# Patient Record
Sex: MALE | Race: WHITE | NOT HISPANIC OR LATINO | Employment: OTHER | URBAN - METROPOLITAN AREA
[De-identification: names, ages, dates, MRNs, and addresses within clinical notes are randomized per-mention and may not be internally consistent; named-entity substitution may affect disease eponyms.]

---

## 2017-04-09 ENCOUNTER — COMMUNICATION - HEALTHEAST (OUTPATIENT)
Dept: INTERNAL MEDICINE | Facility: CLINIC | Age: 57
End: 2017-04-09

## 2017-04-09 DIAGNOSIS — I10 UNSPECIFIED ESSENTIAL HYPERTENSION: ICD-10-CM

## 2017-07-10 ENCOUNTER — COMMUNICATION - HEALTHEAST (OUTPATIENT)
Dept: INTERNAL MEDICINE | Facility: CLINIC | Age: 57
End: 2017-07-10

## 2018-01-09 ENCOUNTER — AMBULATORY - HEALTHEAST (OUTPATIENT)
Dept: INTERNAL MEDICINE | Facility: CLINIC | Age: 58
End: 2018-01-09

## 2018-01-09 DIAGNOSIS — Z12.11 SCREENING FOR COLON CANCER: ICD-10-CM

## 2018-02-09 ENCOUNTER — RECORDS - HEALTHEAST (OUTPATIENT)
Dept: ADMINISTRATIVE | Facility: OTHER | Age: 58
End: 2018-02-09

## 2018-03-01 ENCOUNTER — RECORDS - HEALTHEAST (OUTPATIENT)
Dept: ADMINISTRATIVE | Facility: OTHER | Age: 58
End: 2018-03-01

## 2018-03-02 ENCOUNTER — RECORDS - HEALTHEAST (OUTPATIENT)
Dept: ADMINISTRATIVE | Facility: OTHER | Age: 58
End: 2018-03-02

## 2018-03-16 ENCOUNTER — OFFICE VISIT - HEALTHEAST (OUTPATIENT)
Dept: INTERNAL MEDICINE | Facility: CLINIC | Age: 58
End: 2018-03-16

## 2018-03-16 DIAGNOSIS — Z12.5 SPECIAL SCREENING FOR MALIGNANT NEOPLASM OF PROSTATE: ICD-10-CM

## 2018-03-16 DIAGNOSIS — I48.92 ATRIAL FLUTTER (H): ICD-10-CM

## 2018-03-16 DIAGNOSIS — E78.00 HYPERCHOLESTEREMIA: ICD-10-CM

## 2018-03-16 DIAGNOSIS — D12.6 ADENOMATOUS POLYP OF COLON: ICD-10-CM

## 2018-03-16 DIAGNOSIS — Z00.00 ROUTINE GENERAL MEDICAL EXAMINATION AT A HEALTH CARE FACILITY: ICD-10-CM

## 2018-03-16 DIAGNOSIS — I10 ESSENTIAL HYPERTENSION: ICD-10-CM

## 2018-03-16 LAB
ALBUMIN SERPL-MCNC: 4.3 G/DL (ref 3.5–5)
ALBUMIN UR-MCNC: NEGATIVE MG/DL
ALP SERPL-CCNC: 67 U/L (ref 45–120)
ALT SERPL W P-5'-P-CCNC: 46 U/L (ref 0–45)
ANION GAP SERPL CALCULATED.3IONS-SCNC: 10 MMOL/L (ref 5–18)
APPEARANCE UR: CLEAR
AST SERPL W P-5'-P-CCNC: 41 U/L (ref 0–40)
BILIRUB SERPL-MCNC: 1.2 MG/DL (ref 0–1)
BILIRUB UR QL STRIP: NEGATIVE
BUN SERPL-MCNC: 15 MG/DL (ref 8–22)
CALCIUM SERPL-MCNC: 9.9 MG/DL (ref 8.5–10.5)
CHLORIDE BLD-SCNC: 95 MMOL/L (ref 98–107)
CO2 SERPL-SCNC: 31 MMOL/L (ref 22–31)
COLOR UR AUTO: YELLOW
CREAT SERPL-MCNC: 0.81 MG/DL (ref 0.7–1.3)
ERYTHROCYTE [DISTWIDTH] IN BLOOD BY AUTOMATED COUNT: 10.8 % (ref 11–14.5)
GFR SERPL CREATININE-BSD FRML MDRD: >60 ML/MIN/1.73M2
GLUCOSE BLD-MCNC: 98 MG/DL (ref 70–125)
GLUCOSE UR STRIP-MCNC: NEGATIVE MG/DL
HCT VFR BLD AUTO: 46.4 % (ref 40–54)
HGB BLD-MCNC: 15.8 G/DL (ref 14–18)
HGB UR QL STRIP: NEGATIVE
KETONES UR STRIP-MCNC: NEGATIVE MG/DL
LEUKOCYTE ESTERASE UR QL STRIP: NEGATIVE
MCH RBC QN AUTO: 31.5 PG (ref 27–34)
MCHC RBC AUTO-ENTMCNC: 34 G/DL (ref 32–36)
MCV RBC AUTO: 93 FL (ref 80–100)
NITRATE UR QL: NEGATIVE
PH UR STRIP: 7.5 [PH] (ref 5–8)
PLATELET # BLD AUTO: 197 THOU/UL (ref 140–440)
PMV BLD AUTO: 7.9 FL (ref 7–10)
POTASSIUM BLD-SCNC: 3.9 MMOL/L (ref 3.5–5)
PROT SERPL-MCNC: 7.8 G/DL (ref 6–8)
PSA SERPL-MCNC: 1.3 NG/ML (ref 0–3.5)
RBC # BLD AUTO: 5.01 MILL/UL (ref 4.4–6.2)
SODIUM SERPL-SCNC: 136 MMOL/L (ref 136–145)
SP GR UR STRIP: 1.01 (ref 1–1.03)
UROBILINOGEN UR STRIP-ACNC: NORMAL
WBC: 5.9 THOU/UL (ref 4–11)

## 2018-03-16 ASSESSMENT — MIFFLIN-ST. JEOR: SCORE: 1882.18

## 2018-04-04 ENCOUNTER — COMMUNICATION - HEALTHEAST (OUTPATIENT)
Dept: INTERNAL MEDICINE | Facility: CLINIC | Age: 58
End: 2018-04-04

## 2018-04-04 DIAGNOSIS — I10 ESSENTIAL HYPERTENSION: ICD-10-CM

## 2018-04-11 ENCOUNTER — RECORDS - HEALTHEAST (OUTPATIENT)
Dept: ADMINISTRATIVE | Facility: OTHER | Age: 58
End: 2018-04-11

## 2018-04-16 ENCOUNTER — RECORDS - HEALTHEAST (OUTPATIENT)
Dept: ADMINISTRATIVE | Facility: OTHER | Age: 58
End: 2018-04-16

## 2018-07-20 ENCOUNTER — OFFICE VISIT - HEALTHEAST (OUTPATIENT)
Dept: INTERNAL MEDICINE | Facility: CLINIC | Age: 58
End: 2018-07-20

## 2018-07-20 DIAGNOSIS — M54.16 LEFT LUMBAR RADICULITIS: ICD-10-CM

## 2018-07-26 ENCOUNTER — COMMUNICATION - HEALTHEAST (OUTPATIENT)
Dept: INTERNAL MEDICINE | Facility: CLINIC | Age: 58
End: 2018-07-26

## 2018-07-26 DIAGNOSIS — M54.16 LUMBAR RADICULOPATHY: ICD-10-CM

## 2018-07-30 ENCOUNTER — HOSPITAL ENCOUNTER (OUTPATIENT)
Dept: MRI IMAGING | Facility: CLINIC | Age: 58
Discharge: HOME | End: 2018-07-30
Attending: INTERNAL MEDICINE

## 2018-07-30 DIAGNOSIS — M54.16 LUMBAR RADICULOPATHY: ICD-10-CM

## 2018-08-03 ENCOUNTER — COMMUNICATION - HEALTHEAST (OUTPATIENT)
Dept: INTERNAL MEDICINE | Facility: CLINIC | Age: 58
End: 2018-08-03

## 2018-08-06 ENCOUNTER — AMBULATORY - HEALTHEAST (OUTPATIENT)
Dept: INTERNAL MEDICINE | Facility: CLINIC | Age: 58
End: 2018-08-06

## 2018-08-06 DIAGNOSIS — M54.16 LEFT LUMBAR RADICULOPATHY: ICD-10-CM

## 2018-08-09 ENCOUNTER — HOSPITAL ENCOUNTER (OUTPATIENT)
Dept: PHYSICAL MEDICINE AND REHAB | Facility: CLINIC | Age: 58
Discharge: HOME | End: 2018-08-09
Attending: INTERNAL MEDICINE

## 2018-08-09 ENCOUNTER — HOSPITAL ENCOUNTER (OUTPATIENT)
Dept: RADIOLOGY | Facility: CLINIC | Age: 58
Discharge: HOME | End: 2018-08-09

## 2018-08-09 DIAGNOSIS — M54.16 LUMBAR RADICULOPATHY: ICD-10-CM

## 2018-08-09 DIAGNOSIS — M47.816 LUMBAR SPONDYLOSIS: ICD-10-CM

## 2018-08-09 RX ORDER — IBUPROFEN 200 MG
800 TABLET ORAL PRN
Status: SHIPPED | COMMUNITY
Start: 2018-08-09 | End: 2023-01-30

## 2018-08-09 ASSESSMENT — MIFFLIN-ST. JEOR: SCORE: 1831.04

## 2018-08-10 ENCOUNTER — COMMUNICATION - HEALTHEAST (OUTPATIENT)
Dept: PHYSICAL MEDICINE AND REHAB | Facility: CLINIC | Age: 58
End: 2018-08-10

## 2018-08-16 ENCOUNTER — RECORDS - HEALTHEAST (OUTPATIENT)
Dept: ADMINISTRATIVE | Facility: OTHER | Age: 58
End: 2018-08-16

## 2018-08-20 ENCOUNTER — HOSPITAL ENCOUNTER (OUTPATIENT)
Dept: PHYSICAL MEDICINE AND REHAB | Facility: CLINIC | Age: 58
Discharge: HOME | End: 2018-08-20
Attending: PHYSICAL MEDICINE & REHABILITATION

## 2018-08-20 DIAGNOSIS — M54.16 LUMBAR RADICULOPATHY: ICD-10-CM

## 2018-08-28 ENCOUNTER — COMMUNICATION - HEALTHEAST (OUTPATIENT)
Dept: PHYSICAL MEDICINE AND REHAB | Facility: CLINIC | Age: 58
End: 2018-08-28

## 2018-09-06 ENCOUNTER — HOSPITAL ENCOUNTER (OUTPATIENT)
Dept: PHYSICAL MEDICINE AND REHAB | Facility: CLINIC | Age: 58
Discharge: HOME | End: 2018-09-06
Attending: PAIN MEDICINE

## 2018-09-06 DIAGNOSIS — G57.30 PERONEAL NEUROPATHY: ICD-10-CM

## 2018-09-06 DIAGNOSIS — M47.816 LUMBAR SPONDYLOSIS: ICD-10-CM

## 2018-10-05 ENCOUNTER — RECORDS - HEALTHEAST (OUTPATIENT)
Dept: ADMINISTRATIVE | Facility: OTHER | Age: 58
End: 2018-10-05

## 2018-10-30 ENCOUNTER — RECORDS - HEALTHEAST (OUTPATIENT)
Dept: ADMINISTRATIVE | Facility: OTHER | Age: 58
End: 2018-10-30

## 2018-11-28 ENCOUNTER — RECORDS - HEALTHEAST (OUTPATIENT)
Dept: ADMINISTRATIVE | Facility: OTHER | Age: 58
End: 2018-11-28

## 2018-12-02 ENCOUNTER — COMMUNICATION - HEALTHEAST (OUTPATIENT)
Dept: INTERNAL MEDICINE | Facility: CLINIC | Age: 58
End: 2018-12-02

## 2018-12-02 DIAGNOSIS — I10 ESSENTIAL HYPERTENSION: ICD-10-CM

## 2019-01-10 ENCOUNTER — COMMUNICATION - HEALTHEAST (OUTPATIENT)
Dept: INTERNAL MEDICINE | Facility: CLINIC | Age: 59
End: 2019-01-10

## 2019-01-10 DIAGNOSIS — I10 ESSENTIAL HYPERTENSION: ICD-10-CM

## 2019-01-11 ENCOUNTER — COMMUNICATION - HEALTHEAST (OUTPATIENT)
Dept: INTERNAL MEDICINE | Facility: CLINIC | Age: 59
End: 2019-01-11

## 2019-01-11 DIAGNOSIS — I10 ESSENTIAL HYPERTENSION: ICD-10-CM

## 2019-01-18 ENCOUNTER — COMMUNICATION - HEALTHEAST (OUTPATIENT)
Dept: INTERNAL MEDICINE | Facility: CLINIC | Age: 59
End: 2019-01-18

## 2019-01-24 ENCOUNTER — COMMUNICATION - HEALTHEAST (OUTPATIENT)
Dept: INTERNAL MEDICINE | Facility: CLINIC | Age: 59
End: 2019-01-24

## 2019-02-07 ENCOUNTER — COMMUNICATION - HEALTHEAST (OUTPATIENT)
Dept: INTERNAL MEDICINE | Facility: CLINIC | Age: 59
End: 2019-02-07

## 2019-02-07 ENCOUNTER — OFFICE VISIT - HEALTHEAST (OUTPATIENT)
Dept: INTERNAL MEDICINE | Facility: CLINIC | Age: 59
End: 2019-02-07

## 2019-02-07 DIAGNOSIS — I10 ESSENTIAL HYPERTENSION: ICD-10-CM

## 2019-02-07 LAB
ALBUMIN SERPL-MCNC: 4 G/DL (ref 3.5–5)
ALP SERPL-CCNC: 64 U/L (ref 45–120)
ALT SERPL W P-5'-P-CCNC: 39 U/L (ref 0–45)
ANION GAP SERPL CALCULATED.3IONS-SCNC: 8 MMOL/L (ref 5–18)
AST SERPL W P-5'-P-CCNC: 35 U/L (ref 0–40)
BILIRUB SERPL-MCNC: 0.6 MG/DL (ref 0–1)
BUN SERPL-MCNC: 13 MG/DL (ref 8–22)
CALCIUM SERPL-MCNC: 9.6 MG/DL (ref 8.5–10.5)
CHLORIDE BLD-SCNC: 97 MMOL/L (ref 98–107)
CO2 SERPL-SCNC: 31 MMOL/L (ref 22–31)
CREAT SERPL-MCNC: 0.73 MG/DL (ref 0.7–1.3)
GFR SERPL CREATININE-BSD FRML MDRD: >60 ML/MIN/1.73M2
GLUCOSE BLD-MCNC: 82 MG/DL (ref 70–125)
POTASSIUM BLD-SCNC: 4 MMOL/L (ref 3.5–5)
PROT SERPL-MCNC: 7.5 G/DL (ref 6–8)
SODIUM SERPL-SCNC: 136 MMOL/L (ref 136–145)

## 2019-02-13 ENCOUNTER — COMMUNICATION - HEALTHEAST (OUTPATIENT)
Dept: INTERNAL MEDICINE | Facility: CLINIC | Age: 59
End: 2019-02-13

## 2019-02-13 DIAGNOSIS — I10 ESSENTIAL HYPERTENSION: ICD-10-CM

## 2019-02-18 ENCOUNTER — COMMUNICATION - HEALTHEAST (OUTPATIENT)
Dept: INTERNAL MEDICINE | Facility: CLINIC | Age: 59
End: 2019-02-18

## 2019-08-08 ENCOUNTER — OFFICE VISIT - HEALTHEAST (OUTPATIENT)
Dept: INTERNAL MEDICINE | Facility: CLINIC | Age: 59
End: 2019-08-08

## 2019-08-08 DIAGNOSIS — I10 ESSENTIAL HYPERTENSION: ICD-10-CM

## 2019-08-08 DIAGNOSIS — Z00.00 ROUTINE GENERAL MEDICAL EXAMINATION AT A HEALTH CARE FACILITY: ICD-10-CM

## 2019-08-08 DIAGNOSIS — Z86.79 HISTORY OF ATRIAL FLUTTER: ICD-10-CM

## 2019-08-08 LAB
ALBUMIN SERPL-MCNC: 4.3 G/DL (ref 3.5–5)
ALP SERPL-CCNC: 68 U/L (ref 45–120)
ALT SERPL W P-5'-P-CCNC: 29 U/L (ref 0–45)
ANION GAP SERPL CALCULATED.3IONS-SCNC: 12 MMOL/L (ref 5–18)
AST SERPL W P-5'-P-CCNC: 32 U/L (ref 0–40)
BILIRUB SERPL-MCNC: 0.9 MG/DL (ref 0–1)
BUN SERPL-MCNC: 9 MG/DL (ref 8–22)
CALCIUM SERPL-MCNC: 9.9 MG/DL (ref 8.5–10.5)
CHLORIDE BLD-SCNC: 96 MMOL/L (ref 98–107)
CHOLEST SERPL-MCNC: 237 MG/DL
CO2 SERPL-SCNC: 29 MMOL/L (ref 22–31)
CREAT SERPL-MCNC: 0.69 MG/DL (ref 0.7–1.3)
FASTING STATUS PATIENT QL REPORTED: YES
GFR SERPL CREATININE-BSD FRML MDRD: >60 ML/MIN/1.73M2
GLUCOSE BLD-MCNC: 89 MG/DL (ref 70–125)
HDLC SERPL-MCNC: 75 MG/DL
LDLC SERPL CALC-MCNC: 148 MG/DL
POTASSIUM BLD-SCNC: 3.8 MMOL/L (ref 3.5–5)
PROT SERPL-MCNC: 7.1 G/DL (ref 6–8)
PSA SERPL-MCNC: 1 NG/ML (ref 0–4.5)
SODIUM SERPL-SCNC: 137 MMOL/L (ref 136–145)
TRIGL SERPL-MCNC: 69 MG/DL

## 2019-08-08 ASSESSMENT — MIFFLIN-ST. JEOR: SCORE: 1826.84

## 2019-08-09 LAB — HCV AB SERPL QL IA: NEGATIVE

## 2019-08-29 ENCOUNTER — RECORDS - HEALTHEAST (OUTPATIENT)
Dept: ADMINISTRATIVE | Facility: OTHER | Age: 59
End: 2019-08-29

## 2019-09-16 ENCOUNTER — COMMUNICATION - HEALTHEAST (OUTPATIENT)
Dept: ADMINISTRATIVE | Facility: CLINIC | Age: 59
End: 2019-09-16

## 2020-06-16 ENCOUNTER — COMMUNICATION - HEALTHEAST (OUTPATIENT)
Dept: SCHEDULING | Facility: CLINIC | Age: 60
End: 2020-06-16

## 2020-07-07 ENCOUNTER — RECORDS - HEALTHEAST (OUTPATIENT)
Dept: ADMINISTRATIVE | Facility: OTHER | Age: 60
End: 2020-07-07

## 2020-07-07 LAB — EJECTION FRACTION: 60 %

## 2020-07-18 ENCOUNTER — OFFICE VISIT - HEALTHEAST (OUTPATIENT)
Dept: FAMILY MEDICINE | Facility: CLINIC | Age: 60
End: 2020-07-18

## 2020-07-18 DIAGNOSIS — Z48.02 ENCOUNTER FOR REMOVAL OF SUTURES: ICD-10-CM

## 2020-08-22 ENCOUNTER — COMMUNICATION - HEALTHEAST (OUTPATIENT)
Dept: INTERNAL MEDICINE | Facility: CLINIC | Age: 60
End: 2020-08-22

## 2020-08-22 DIAGNOSIS — I10 ESSENTIAL HYPERTENSION: ICD-10-CM

## 2020-08-27 ENCOUNTER — OFFICE VISIT - HEALTHEAST (OUTPATIENT)
Dept: INTERNAL MEDICINE | Facility: CLINIC | Age: 60
End: 2020-08-27

## 2020-08-27 DIAGNOSIS — I48.0 PAROXYSMAL ATRIAL FIBRILLATION (H): ICD-10-CM

## 2020-08-27 DIAGNOSIS — E78.00 HYPERCHOLESTEREMIA: ICD-10-CM

## 2020-08-27 DIAGNOSIS — I10 ESSENTIAL HYPERTENSION: ICD-10-CM

## 2020-08-27 DIAGNOSIS — Z00.00 ROUTINE GENERAL MEDICAL EXAMINATION AT A HEALTH CARE FACILITY: ICD-10-CM

## 2020-08-27 LAB
ALBUMIN SERPL-MCNC: 4.3 G/DL (ref 3.5–5)
ALP SERPL-CCNC: 66 U/L (ref 45–120)
ALT SERPL W P-5'-P-CCNC: 36 U/L (ref 0–45)
ANION GAP SERPL CALCULATED.3IONS-SCNC: 9 MMOL/L (ref 5–18)
AST SERPL W P-5'-P-CCNC: 37 U/L (ref 0–40)
BILIRUB SERPL-MCNC: 0.5 MG/DL (ref 0–1)
BUN SERPL-MCNC: 12 MG/DL (ref 8–22)
CALCIUM SERPL-MCNC: 9.6 MG/DL (ref 8.5–10.5)
CHLORIDE BLD-SCNC: 100 MMOL/L (ref 98–107)
CHOLEST SERPL-MCNC: 214 MG/DL
CO2 SERPL-SCNC: 30 MMOL/L (ref 22–31)
CREAT SERPL-MCNC: 0.75 MG/DL (ref 0.7–1.3)
FASTING STATUS PATIENT QL REPORTED: YES
GFR SERPL CREATININE-BSD FRML MDRD: >60 ML/MIN/1.73M2
GLUCOSE BLD-MCNC: 94 MG/DL (ref 70–125)
HDLC SERPL-MCNC: 63 MG/DL
LDLC SERPL CALC-MCNC: 117 MG/DL
POTASSIUM BLD-SCNC: 4.3 MMOL/L (ref 3.5–5)
PROT SERPL-MCNC: 7.4 G/DL (ref 6–8)
PSA SERPL-MCNC: 0.9 NG/ML (ref 0–4.5)
SODIUM SERPL-SCNC: 139 MMOL/L (ref 136–145)
TRIGL SERPL-MCNC: 170 MG/DL

## 2020-08-27 RX ORDER — SIMVASTATIN 40 MG
40 TABLET ORAL DAILY
Status: SHIPPED | COMMUNITY
Start: 2020-06-18 | End: 2024-04-12

## 2020-08-27 RX ORDER — METOPROLOL SUCCINATE 25 MG/1
25 TABLET, EXTENDED RELEASE ORAL DAILY
Status: SHIPPED | COMMUNITY
Start: 2020-06-18 | End: 2024-04-12

## 2020-08-27 ASSESSMENT — MIFFLIN-ST. JEOR: SCORE: 1862

## 2020-09-15 ENCOUNTER — COMMUNICATION - HEALTHEAST (OUTPATIENT)
Dept: INTERNAL MEDICINE | Facility: CLINIC | Age: 60
End: 2020-09-15

## 2020-09-15 DIAGNOSIS — I10 ESSENTIAL HYPERTENSION: ICD-10-CM

## 2020-09-15 RX ORDER — LOSARTAN POTASSIUM 50 MG/1
50 TABLET ORAL DAILY
Qty: 90 TABLET | Refills: 3 | Status: SHIPPED | OUTPATIENT
Start: 2020-09-15 | End: 2021-12-01

## 2020-09-15 RX ORDER — CHLORTHALIDONE 25 MG/1
25 TABLET ORAL DAILY
Qty: 90 TABLET | Refills: 3 | Status: SHIPPED | OUTPATIENT
Start: 2020-09-15 | End: 2021-12-24

## 2020-10-06 ENCOUNTER — RECORDS - HEALTHEAST (OUTPATIENT)
Dept: ADMINISTRATIVE | Facility: OTHER | Age: 60
End: 2020-10-06

## 2021-05-31 NOTE — PROGRESS NOTES
Assessment:      Healthy male exam.      Hypertension, controlled.    History of atrial flutter, status post cardioversion in 2005.     Plan:       Check a CMP, PSA, lipid profile, and hep C antibody.  Vaccinations are up-to-date.  Colon cancer screening is up-to-date.  Follow-up 1 year, earlier if needed.     Subjective:      Alan Frausto is a 58 y.o. male who presents for an annual exam.  He is feeling well today and has no acute complaints.  He wants to get back into routine cardiovascular fitness and I told him I saw no medical contraindications to this.        Immunization History   Administered Date(s) Administered     DT (pediatric) 10/14/2002, 02/09/2006     Influenza, Seasonal, Inj PF IIV3 02/16/2011, 12/03/2013     Td,adult,historic,unspecified 02/09/2006     Tdap 07/28/2015     Immunization status: up to date and documented.    No exam data present    Current Outpatient Medications   Medication Sig Dispense Refill     chlorthalidone (HYGROTEN) 25 MG tablet Take 1 tablet (25 mg total) by mouth daily. 90 tablet 3     cholecalciferol, vitamin D3, (VITAMIN D3) 2,000 unit capsule Take 1,000 Units by mouth daily.       ibuprofen (ADVIL,MOTRIN) 200 MG tablet Take 800 mg by mouth as needed.              losartan (COZAAR) 50 MG tablet Take 1 tablet (50 mg total) by mouth daily. 90 tablet 3     multivitamin therapeutic tablet Take 1 tablet by mouth daily.       No current facility-administered medications for this visit.      History reviewed. No pertinent past medical history.  Past Surgical History:   Procedure Laterality Date     CATARACT EXTRACTION Right 1998     INGUINAL HERNIA REPAIR Left 2007     Patient has no known allergies.  Family History   Problem Relation Age of Onset     Macular degeneration Father      Cancer Mother         bladder - smoker     Social History     Socioeconomic History     Marital status:      Spouse name: Not on file     Number of children: Not on file     Years of  "education: Not on file     Highest education level: Not on file   Occupational History     Not on file   Social Needs     Financial resource strain: Not on file     Food insecurity:     Worry: Not on file     Inability: Not on file     Transportation needs:     Medical: Not on file     Non-medical: Not on file   Tobacco Use     Smoking status: Never Smoker     Smokeless tobacco: Never Used   Substance and Sexual Activity     Alcohol use: Yes     Alcohol/week: 3.6 - 4.8 oz     Types: 6 - 8 Glasses of wine per week     Drug use: No     Sexual activity: Yes     Partners: Female   Lifestyle     Physical activity:     Days per week: Not on file     Minutes per session: Not on file     Stress: Not on file   Relationships     Social connections:     Talks on phone: Not on file     Gets together: Not on file     Attends Yazidism service: Not on file     Active member of club or organization: Not on file     Attends meetings of clubs or organizations: Not on file     Relationship status: Not on file     Intimate partner violence:     Fear of current or ex partner: Not on file     Emotionally abused: Not on file     Physically abused: Not on file     Forced sexual activity: Not on file   Other Topics Concern     Not on file   Social History Narrative     Not on file       Review of Systems  General:  Denies problem  Eyes: Denies problem  Ears/Nose/Throat: Denies problem  Cardiovascular: Denies problem  Respiratory:  Denies problem  Gastrointestinal:  Denies problem  Genitourinary: Denies problem  Musculoskeletal:  Denies problem  Skin: Denies problem  Neurologic: Denies problem  Psychiatric: Denies problem  Endocrine: Denies problem  Heme/Lymphatic: Denies problem   Allergic/Immunologic: Denies problem        Objective:     Vitals:    08/08/19 0808 08/08/19 0837   BP: (!) 152/96 139/86   Pulse: 68    Weight: 211 lb (95.7 kg)    Height: 6' 2\" (1.88 m)      Body mass index is 27.09 kg/m .    Physical  General Appearance: " Alert, cooperative, no distress, appears stated age  Head: Normocephalic, without obvious abnormality, atraumatic  Eyes: PERRL, conjunctiva/corneas clear, EOM's intact  Ears: Normal TM's and external ear canals, both ears  Nose: Nares normal, septum midline,mucosa normal, no drainage  Throat: Lips, mucosa, and tongue normal; teeth and gums normal  Neck: Supple, symmetrical, trachea midline, no adenopathy;  thyroid: not enlarged, symmetric, no tenderness/mass/nodules; no carotid bruit or JVD  Back: Symmetric, no curvature, ROM normal, no CVA tenderness  Lungs: Clear to auscultation bilaterally, respirations unlabored  Heart: Regular rate and rhythm, S1 and S2 normal, no murmur, rub, or gallop,  Abdomen: Soft, non-tender, bowel sounds active all four quadrants,  no masses, no organomegaly  Musculoskeletal: Normal range of motion. No joint swelling or deformity.   Extremities: Extremities normal, atraumatic, no cyanosis or edema  Skin: Skin color, texture, turgor normal, no rashes or lesions  Lymph nodes: Cervical, supraclavicular, and axillary nodes normal  Neurologic: He is alert. He has normal reflexes.   Psychiatric: He has a normal mood and affect.

## 2021-06-01 VITALS — WEIGHT: 214.4 LBS | BODY MASS INDEX: 26.98 KG/M2

## 2021-06-01 VITALS — HEIGHT: 75 IN | WEIGHT: 219.7 LBS | BODY MASS INDEX: 27.32 KG/M2

## 2021-06-01 VITALS — BODY MASS INDEX: 26.02 KG/M2 | HEIGHT: 75 IN | WEIGHT: 209.3 LBS

## 2021-06-01 NOTE — TELEPHONE ENCOUNTER
Form completed and signed by Dr. Ellison. Copy placed to scans. Original mailed back to patient.  Kat Ledezma CMA ............... 4:37 PM, 09/19/19

## 2021-06-02 VITALS — WEIGHT: 223 LBS | BODY MASS INDEX: 28.06 KG/M2

## 2021-06-03 VITALS — WEIGHT: 211 LBS | BODY MASS INDEX: 27.08 KG/M2 | HEIGHT: 74 IN

## 2021-06-04 VITALS
OXYGEN SATURATION: 96 % | WEIGHT: 217 LBS | DIASTOLIC BLOOD PRESSURE: 77 MMHG | RESPIRATION RATE: 16 BRPM | BODY MASS INDEX: 27.86 KG/M2 | HEART RATE: 69 BPM | SYSTOLIC BLOOD PRESSURE: 138 MMHG

## 2021-06-04 VITALS
HEIGHT: 75 IN | BODY MASS INDEX: 26.98 KG/M2 | WEIGHT: 217 LBS | SYSTOLIC BLOOD PRESSURE: 132 MMHG | DIASTOLIC BLOOD PRESSURE: 89 MMHG | HEART RATE: 72 BPM

## 2021-06-08 NOTE — TELEPHONE ENCOUNTER
RN triage  Alan is calling today stating that in 2005 he had an episode of afib, was medicated and the rhythm resolved on it's own. He states he has not had any issues really with it since except when he becomes dehydrated. He says that on Sunday his blood pressure cuff is reporting that he has been in an irregular rhythm. Heart rate ranges from 60-90, occasionally reading at 140. Does not report that his heart is beating very fast currently. Bps range 100//80. Sunday he felt dizzy when he got up from sitting. Tried to exercise today and felt tired. Last had symptoms about 1 year ago, typically will rest and drink fluids and rhythm will resolve. No chest pain, shortness of breath only with exertion. Not currently dizzy.     I advised Alan he should be seen in ER/UC today to evaluate heart rhythm and symptoms, Alan agrees to this plan and understands to call back with worsening.    Thao Landin RN  [facility] Nurse Advisor      Additional Information    Negative: Passed out (i.e., fainted, collapsed and was not responding)    Negative: Shock suspected (e.g., cold/pale/clammy skin, too weak to stand, low BP, rapid pulse)    Negative: Difficult to awaken or acting confused (e.g., disoriented, slurred speech)    Negative: Visible sweat on face or sweat dripping down face    Negative: Unable to walk, or can only walk with assistance (e.g., requires support)    Negative: Received SHOCK from implantable cardiac defibrillator and has persisting symptoms (i.e., palpitations, lightheadedness)    Negative: Sounds like a life-threatening emergency to the triager    New or worsened shortness of breath with activity (dyspnea on exertion)    Negative: Difficulty breathing    Negative: Dizziness, lightheadedness, or weakness    Negative: Heart beating very rapidly (e.g., > 140 / minute) and present now (EXCEPTION: during exercise)    Negative: Heart beating very slowly (e.g., < 50 / minute) (EXCEPTION:  athlete)    Protocols used: HEART RATE AND HEARTBEAT [id number]    COVID 19 Nurse Triage Plan/Patient Instructions    Please be aware that novel coronavirus (COVID-19) may be circulating in the community. If you develop symptoms such as fever, cough, or SOB or if you have concerns about the presence of another infection including coronavirus (COVID-19), please contact your health care provider or visit [email or web address].     Disposition/Instructions    Patient to schedule an In Person Visit with provider. Reference Visit Selection Guide.    Thank you for taking steps to prevent the spread of this virus.  o Limit your contact with others.  o Wear a simple mask to cover your cough.  o Wash your hands well and often.    Resources    [facility]: About COVID-19: [email or web address]    CDC: What to Do If You're Sick: www.cdc.gov/coronavirus/2019-ncov/about/steps-when-sick.html    CDC: Ending Home Isolation: www.cdc.gov/coronavirus/2019-ncov/hcp/disposition-in-home-patients.html     CDC: Caring for Someone: www.cdc.gov/coronavirus/2019-ncov/if-you-are-sick/care-for-someone.html     [facility]: Interim Guidance for Hospital Discharge to Home: [email or web address]/[email or web address]    [facility] clinical trials (COVID-19 research studies): clinicalaffairs.[facility]-clinical-trials     Below are the COVID-19 hotlines at the [facility] ([facility]). Interpreters are available.   o For health questions: Call 512-885-0096 or 1-896.249.4338 (7 a.m. to 7 p.m.)  o For questions about schools and childcare: Call 453-863-4444 or 1-585.527.8417 (7 a.m. to 7 p.m.)

## 2021-06-09 NOTE — PROGRESS NOTES
"Patient presents for suture removal. Patient sustained a laceration with a chain saw on 7/4 to his left knee, was seen at [facility]. Tetanus updated and recommended removal of sutures in 10-14 days.  The wound is well healed without signs of infection. No fevers, chills or surrounding erythema. The sutures removed. Wound care and activity instructions given. Return prn. See procedure note as below       PROCEDURE: SUTURE REMOVAL   Performed by: Denise Aranda DO  Consent: Verbal consent obtained.  Risks and benefits: risks, benefits and alternatives were discussed  Consent given by: patient  Patient understanding: patient states understanding of the procedure being performed  Patient consent: the patient's understanding of the procedure matches consent given  Patient identity confirmed: verbally with patient   Time out: Immediately prior to procedure a \"time out\" was called to verify the correct patient, procedure, equipment, support staff and site/side marked as required.  Body area: Left Knee   Wound Appearance: clean  Post-removal: Wound kept open and dry. No bandage needed   Patient tolerance: Patient tolerated the procedure well with no immediate complications.      Denise Aranda DO     "

## 2021-06-10 NOTE — TELEPHONE ENCOUNTER
RN cannot approve Refill Request    RN can NOT refill this medication PCP messaged that patient is overdue for Labs. Last office visit: 2/7/2019 Maurizio Ellison MD Last Physical: 8/8/2019 Last MTM visit: Visit date not found Last visit same specialty: 2/7/2019 Maurizio Ellison MD.  Next visit within 3 mo: Visit date not found  Next physical within 3 mo: Visit date not found      Donya Garcia, [facility] Triage/Med Refill 8/23/2020    Requested Prescriptions   Pending Prescriptions Disp Refills     chlorthalidone (HYGROTEN) 25 MG tablet [Pharmacy Med Name: CHLORTHALIDONE 25 MG TABLET] 90 tablet 3     Sig: TAKE 1 TABLET BY MOUTH EVERY DAY       Diuretics/Combination Diuretics Refill Protocol  Failed - 8/22/2020  9:34 AM        Failed - Serum Potassium in past 12 months      No results found for: LN-POTASSIUM          Failed - Serum Sodium in past 12 months      No results found for: LN-SODIUM          Failed - Serum Creatinine in past 12 months      Creatinine   Date Value Ref Range Status   08/08/2019 0.69 (L) 0.70 - 1.30 mg/dL Final             Passed - Visit with PCP or prescribing provider visit in past 12 months     Last office visit with prescriber/PCP: 2/7/2019 Maurizio Ellison MD OR same dept: Visit date not found OR same specialty: 2/7/2019 Maurizio Ellison MD  Last physical: 8/8/2019 Last MTM visit: Visit date not found   Next visit within 3 mo: Visit date not found  Next physical within 3 mo: Visit date not found  Prescriber OR PCP: Maurizio Ellison MD  Last diagnosis associated with med order: 1. Essential hypertension  - chlorthalidone (HYGROTEN) 25 MG tablet [Pharmacy Med Name: CHLORTHALIDONE 25 MG TABLET]; TAKE 1 TABLET BY MOUTH EVERY DAY  Dispense: 90 tablet; Refill: 3  - losartan (COZAAR) 50 MG tablet [Pharmacy Med Name: LOSARTAN POTASSIUM 50 MG TAB]; TAKE 1 TABLET BY MOUTH EVERY DAY  Dispense: 90 tablet; Refill: 3    If protocol passes may refill for 12 months  if within 3 months of last provider visit (or a total of 15 months).             Passed - Blood pressure on file in past 12 months     BP Readings from Last 1 Encounters:   07/18/20 138/77                losartan (COZAAR) 50 MG tablet [Pharmacy Med Name: LOSARTAN POTASSIUM 50 MG TAB] 90 tablet 3     Sig: TAKE 1 TABLET BY MOUTH EVERY DAY       Angiotensin Receptor Blocker Protocol Failed - 8/22/2020  9:34 AM        Failed - Serum potassium within the past 12 months     No results found for: LN-POTASSIUM          Failed - Serum creatinine within the past 12 months     Creatinine   Date Value Ref Range Status   08/08/2019 0.69 (L) 0.70 - 1.30 mg/dL Final             Passed - PCP or prescribing provider visit in past 12 months       Last office visit with prescriber/PCP: 2/7/2019 Maurizio Ellisno MD OR same dept: Visit date not found OR same specialty: 2/7/2019 Mauriizo Ellison MD  Last physical: 8/8/2019 Last MTM visit: Visit date not found   Next visit within 3 mo: Visit date not found  Next physical within 3 mo: Visit date not found  Prescriber OR PCP: Maurizio Ellison MD  Last diagnosis associated with med order: 1. Essential hypertension  - chlorthalidone (HYGROTEN) 25 MG tablet [Pharmacy Med Name: CHLORTHALIDONE 25 MG TABLET]; TAKE 1 TABLET BY MOUTH EVERY DAY  Dispense: 90 tablet; Refill: 3  - losartan (COZAAR) 50 MG tablet [Pharmacy Med Name: LOSARTAN POTASSIUM 50 MG TAB]; TAKE 1 TABLET BY MOUTH EVERY DAY  Dispense: 90 tablet; Refill: 3    If protocol passes may refill for 12 months if within 3 months of last provider visit (or a total of 15 months).             Passed - Blood pressure filed in past 12 months     BP Readings from Last 1 Encounters:   07/18/20 138/77

## 2021-06-10 NOTE — PROGRESS NOTES
Assessment:      Healthy male exam.      Hypertension, well controlled    Hyperlipidemia    Atrial fibrillation, well rate controlled.     Plan:      Check a lipid profile, CMP, and PSA.  Vaccinations are up-to-date.  Colon cancer screening will be due in 3 years.  Follow-up 1 year, earlier if needed.    Subjective:      Alan Frausto is a 59 y.o. male who presents for an annual exam.  He is feeling well today and has no acute complaints.  He works for [employer] in their [employer] department and has been working from home since March.  His past medical problems above and beyond his physical exam were reviewed and are outlined as below:    History of hypertension, on losartan and metoprolol.  Blood pressure is at goal today.  He will be due for labs.    Hyperlipidemia, currently on simvastatin.  Check lipids as above.    History of atrial fibrillation.  He saw his cardiologist approximately 2 months ago who recommended Xarelto.  After a discussion of the risks and benefits with him Alan has elected to stay off of the Xarelto and use a baby aspirin.  His chads 2 score is 1 and thus he is in an intermediate risk profile.    Immunization History   Administered Date(s) Administered     DT (pediatric) 10/14/2002, 02/09/2006     Influenza, Seasonal, Inj PF IIV3 02/16/2011, 12/03/2013     Td,adult,historic,unspecified 02/09/2006     Tdap 07/28/2015, 07/04/2020     ZOSTER, RECOMBINANT, IM 07/20/2018     Immunization status: up to date and documented, stated as current,   No exam data present    Current Outpatient Medications   Medication Sig Dispense Refill     aspirin 81 MG EC tablet Take 81 mg by mouth daily.       chlorthalidone (HYGROTEN) 25 MG tablet TAKE 1 TABLET BY MOUTH EVERY DAY 90 tablet 3     cholecalciferol, vitamin D3, (VITAMIN D3) 2,000 unit capsule Take 1,000 Units by mouth daily.       ibuprofen (ADVIL,MOTRIN) 200 MG tablet Take 800 mg by mouth as needed.              losartan (COZAAR) 50 MG tablet  TAKE 1 TABLET BY MOUTH EVERY DAY 90 tablet 3     metoprolol succinate (TOPROL-XL) 25 MG Take 25 mg by mouth daily.       multivitamin therapeutic tablet Take 1 tablet by mouth daily.       simvastatin (ZOCOR) 40 MG tablet Take 40 mg by mouth daily.       No current facility-administered medications for this visit.      No past medical history on file.  Past Surgical History:   Procedure Laterality Date     CATARACT EXTRACTION Right 1998     INGUINAL HERNIA REPAIR Left 2007     Patient has no known allergies.  Family History   Problem Relation Age of Onset     Macular degeneration Father      Cancer Mother         bladder - smoker     Social History     Socioeconomic History     Marital status:      Spouse name: Not on file     Number of children: Not on file     Years of education: Not on file     Highest education level: Not on file   Occupational History     Not on file   Social Needs     Financial resource strain: Not on file     Food insecurity     Worry: Not on file     Inability: Not on file     Transportation needs     Medical: Not on file     Non-medical: Not on file   Tobacco Use     Smoking status: Never Smoker     Smokeless tobacco: Never Used   Substance and Sexual Activity     Alcohol use: Yes     Alcohol/week: 6.0 - 8.0 standard drinks     Types: 6 - 8 Glasses of wine per week     Drug use: No     Sexual activity: Yes     Partners: Female   Lifestyle     Physical activity     Days per week: Not on file     Minutes per session: Not on file     Stress: Not on file   Relationships     Social connections     Talks on phone: Not on file     Gets together: Not on file     Attends Confucianist service: Not on file     Active member of club or organization: Not on file     Attends meetings of clubs or organizations: Not on file     Relationship status: Not on file     Intimate partner violence     Fear of current or ex partner: Not on file     Emotionally abused: Not on file     Physically abused: Not on  "file     Forced sexual activity: Not on file   Other Topics Concern     Not on file   Social History Narrative     Not on file       Review of Systems  General:  Denies problem  Eyes: Denies problem  Ears/Nose/Throat: Denies problem  Cardiovascular: Denies problem  Respiratory:  Denies problem  Gastrointestinal:  Denies problem  Genitourinary: Denies problem  Musculoskeletal:  Denies problem  Skin: Denies problem  Neurologic: Denies problem  Psychiatric: Denies problem  Endocrine: Denies problem  Heme/Lymphatic: Denies problem   Allergic/Immunologic: Denies problem        Objective:     Vitals:    08/27/20 0825 08/27/20 0851   BP: 144/90 132/89   Pulse: 72    Weight: 217 lb (98.4 kg)    Height: 6' 2.5\" (1.892 m)      Body mass index is 27.49 kg/m .    Physical  General Appearance: Alert, cooperative, no distress, appears stated age  Head: Normocephalic, without obvious abnormality, atraumatic  Eyes: PERRL, conjunctiva/corneas clear, EOM's intact  Ears: Normal TM's and external ear canals, both ears  Nose: Nares normal, septum midline,mucosa normal, no drainage  Throat: Lips, mucosa, and tongue normal; teeth and gums normal  Neck: Supple, symmetrical, trachea midline, no adenopathy;  thyroid: not enlarged, symmetric, no tenderness/mass/nodules; no carotid bruit or JVD  Back: Symmetric, no curvature, ROM normal, no CVA tenderness  Lungs: Clear to auscultation bilaterally, respirations unlabored  Heart: Regular rate and rhythm, S1 and S2 normal, no murmur, rub, or gallop,  Abdomen: Soft, non-tender, bowel sounds active all four quadrants,  no masses, no organomegaly   Musculoskeletal: Normal range of motion. No joint swelling or deformity.   Extremities: Extremities normal, atraumatic, no cyanosis or edema  Skin: Skin color, texture, turgor normal, no rashes or lesions  Lymph nodes: Cervical, supraclavicular, and axillary nodes normal  Neurologic: He is alert. He has normal reflexes.   Psychiatric: He has a normal mood " and affect.

## 2021-06-16 PROBLEM — I48.0 PAROXYSMAL ATRIAL FIBRILLATION (H): Status: ACTIVE | Noted: 2020-08-27

## 2021-06-16 PROBLEM — D12.6 ADENOMATOUS POLYP OF COLON: Status: ACTIVE | Noted: 2018-03-16

## 2021-06-16 NOTE — PROGRESS NOTES
ASSESSMENT:  1. Adenomatous polyp of colon  New diagnosis and will be on a five-year plan now    2. Routine general medical examination at a health care facility  All up-to-date after labs checked today.  He does need more calcium in his diet    3. Hypertension  Well-controlled on current meds at least reasonably well enough controlled and continue same.  - Comprehensive Metabolic Panel  - HM2(CBC w/o Differential)  - Urinalysis    4. Atrial flutter  No recurrence of symptoms    5. Hypercholesteremia- CAC 16 3/15  Needs repeat CT heart scan and appropriate diet and activity. he said that his cholesterol was checked at work and was excellent.  If his CT heart scan is less than 40 I would probably not start medicine but if it were over that I would encourage that we do start regardless of his cholesterol.  - Comprehensive Metabolic Panel    6. Special screening for malignant neoplasm of prostate    - PSA (Prostatic-Specific Antigen), Annual Screen    PLAN:  Patient Instructions   Please call your insurance company and discuss Shingrix vaccine. This is a series of 2 injections that MUST be given 2-6 months apart and will cost around $287.00 here at [facility].    Please note that if over the counter medications were taken off of your medication list, it is not because you are being asked to stop taking them.  does not want all of the over the counter medications on your medication list, as it bogs down the list.     Continue the Vitamin D3 daily    You should not be taking an extra vitamin E supplement. You can continue the B complex, but there should be some in your multivitamin as well.     Recommend repeat CT Heart Scan for calcium score, [facility]- #[phone] $100 out of pocket to determine if you need to use/increase statin medicine to decrease risk of stroke and heart attack.     Check out the book 7 Steps to a Pain Free Life by Yvan Li for your back.     Look up hip flexor  stretching and strengthening and pelvic girdle strengthening online.     Planks and exercises with an exercise ball can be good for strengthening your back and core.     Keep working on eating less and moving more!    Make sure you still get enough calcium in your diet. Aim for a total of 1000 mg of calcium daily (dietary and supplemental).     Try not to have more than four egg yolks per week.           Orders Placed This Encounter   Procedures     Comprehensive Metabolic Panel     HM2(CBC w/o Differential)     Urinalysis     PSA (Prostatic-Specific Antigen), Annual Screen     Medications Discontinued During This Encounter   Medication Reason     cholecalciferol, vitamin D3, (VITAMIN D3) 1,000 unit capsule        Return in about 1 year (around 3/16/2019) for Annual physical.    ASSESSED PROBLEMS:  Problem List Items Addressed This Visit     Hypercholesteremia- CAC 16 3/15    Relevant Orders    Comprehensive Metabolic Panel    Atrial Flutter    Hypertension    Relevant Orders    Comprehensive Metabolic Panel    HM2(CBC w/o Differential)    Urinalysis    Adenomatous polyp of colon      Other Visit Diagnoses     Routine general medical examination at a health care facility    -  Primary    Special screening for malignant neoplasm of prostate        Relevant Orders    PSA (Prostatic-Specific Antigen), Annual Screen          CHIEF COMPLAINT:  Chief Complaint   Patient presents with     Annual Exam     left leg weakness       HISTORY OF PRESENT ILLNESS:  Alan Frausto is a 57 y.o. male presenting to the clinic today for an annual physical exam and with complaints of leg weakness.    Leg Weakness: He believes he has a herniated disc in his back. He does not have any pain, but he gets occasional weakness and a feeling of instability down his left leg. This bothers him when he lifts up his left leg to pull his shoe on. He has not thought about how it feels weak when the leg is off the ground, but that is the sensation he  has had. He has tried physical therapy in the past, but he does not like it and it has been expensive. He was treated with two steroid bursts in the past, and he was inactive for a period of time after that. He knows that he lost some muscle mass during this time. He does not have any instability when walking outside.     Health Maintenance: He had a colonoscopy earlier this month and was given a five year clearance due to an adenomatous polyp. His immunizations are up to date. He will have a PSA checked today.     REVIEW OF SYSTEMS:   He had a CT heart scan in 2015 that showed a calcium score of 16. He thought his father had a vitamin E deficiency, but after further discussion, he acknowledges it could have been vitamin B12. He is taking a multivitamin, a B complex, and vitamin D. He had a cholesterol level checked at work the other day. He recalls his total cholesterol being a little elevated, but his HDL was around 110. He was seen by his dermatologist about a month ago and did not have any concerning lesions. He has also had his eyes checked fairly recently. He has a skin tag in the external anus area that he plans to have banded. It has been irritated in the past. He has lost some weight in the past couple of weeks, and he would like to get down to around 200 pounds. See completed form B. Comprehensive review of systems negative except as noted above.    PFSH:  He is walking six days per week for exercise. His wife is very active, and she is pushing him to start being more active. His father had macular degeneration. He is going to [place] in June. 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 He tried the whole 30 diet for a month, but he did not like it.     History reviewed. No pertinent past medical history.  History reviewed. No pertinent surgical history.  Family History   Problem Relation Age of Onset     Macular degeneration Father      Social History     Social History      "Marital status:      Spouse name: N/A     Number of children: N/A     Years of education: N/A     Occupational History     Not on file.     Social History Main Topics     Smoking status: Never Smoker     Smokeless tobacco: Never Used     Alcohol use 3.6 - 4.8 oz/week     6 - 8 Glasses of wine per week     Drug use: No     Sexual activity: Yes     Partners: Female     Other Topics Concern     Not on file     Social History Narrative       VITALS:  Vitals:    03/16/18 0806   BP: 138/82   Pulse: 66   Weight: 219 lb 11.2 oz (99.7 kg)   Height: 6' 3\" (1.905 m)     Wt Readings from Last 3 Encounters:   03/16/18 219 lb 11.2 oz (99.7 kg)   01/19/16 214 lb 4.8 oz (97.2 kg)   02/05/15 212 lb 11.2 oz (96.5 kg)     Body mass index is 27.46 kg/(m^2).    The following high BMI interventions were performed this visit: encouragement to exercise and weight monitoring    PHYSICAL EXAM:  General Appearance: Alert, cooperative, no distress, appears stated age.  HEENT: EOMI, fundi not observed, TMs normal, mouth and throat without lesions.  Neck: Supple without adenopathy or thyromegaly.  Back: No CVA tenderness or spinous process pain.  Lungs: Clear to auscultation bilaterally, good air movement.  Heart: Regular rate and rhythm, S1 and S2 normal, no murmur or bruit.  Abdomen: Soft, non-tender, no HSM or masses.  Genitourinary: Normal male, testes descended without masses or hernias.  Rectal exam:  Normal size for age, smooth, right lobe slightly large than the left, without nodules. External tag.   Musculoskeletal: No gross abnormalities.  Extremities: No CCE, pulses II/IV and symmetric,   Skin: No worrisome lesions noted.  Lymph nodes: Cervical, supraclavicular, groin, and axillary nodes normal.  Neurologic: CNII-XII intact, strength V/V and symmetric, DTRs II/IV and symmetric, sensory grossly intact  Psychiatric: he has a normal mood and affect.     Notes Reviewed, additional history from source other than patient (2 TOTAL): " Reviewed 3/1/2018 colonoscopy report regarding polyp and five year clearance.     Accessed Care Everywhere, Requested Records, Consult with Physician (1 TOTAL): None.     Radiology tests summarized or ordered (XR, CT, MRI, DXA, US): Reviewed March 2015 CT heart scan     Labs reviewed or ordered (1 TOTAL): Labs from 1/19/2016 reviewed. Labs ordered.     Medicine tests reviewed or ordered (ECG, echocardiogram, colonoscopy, EGD, venous US) (1 TOTAL): None.    Independent review of ECG or XR (2 EACH): None.    MEDICATIONS:  Current Outpatient Prescriptions   Medication Sig Dispense Refill     chlorthalidone (HYGROTEN) 25 MG tablet Take 1 tablet (25 mg total) by mouth daily. 90 tablet 3     losartan (COZAAR) 50 MG tablet Take 1 tablet (50 mg total) by mouth daily. 90 tablet 3     No current facility-administered medications for this visit.        The visit lasted a total of 28 minutes face to face with the patient. Over 50% of the time was spent counseling and educating the patient about health maintenance.    IChung, am scribing for and in the presence of, Dr. Munson.    I, Dr. Munson, personally performed the services described in this documentation, as scribed by Chung Loo in my presence, and it is both accurate and complete.    Dragon dictation was used for this note.  Speech recognition errors are a possibility.      Total data points: 4

## 2021-06-18 NOTE — PATIENT INSTRUCTIONS - HE
"Patient Instructions by Denise Aranda DO at 7/18/2020 10:50 AM     Author: Denise Aranda DO Service: -- Author Type: Resident    Filed: 7/18/2020 11:01 AM Encounter Date: 7/18/2020 Status: Signed    : Denise Aranda DO (Resident)       Patient Education     Suture or Staple Removal  You were seen today for a suture or staple removal. Your wound is healing as expected. The wound has healed well enough that the sutures or staples can be removed. The wound will continue to heal for the next few months.  At this time there is no sign of infection.   Home care    If you have pain, take pain medicine as advised by your healthcare provider.     Keep your wound clean and protected by covering it with a bandage for the next week or so.     Wash your hands with soap and warm water before and after caring for your wound. This helps prevent infection.    Clean the wound gently with soap and warm water daily or as directed by your laura health care provider. Do not use iodine, alcohol, or other cleansers on the wound.  Gently pat it dry. Put on a new bandage, if needed. Do not reuse bandages.    If the area gets wet, gently pat it dry with a clean cloth. Replace the wet bandage with a dry one.    Check the wound daily for signs of infection. (These are listed under \"When to seek medical advice\" below.)    You may shower and bathe as usual. Swimming is now permitted.  Follow-up care  Follow up with your healthcare provider as advised.  When to seek medical advice   Call your healthcare provider if any of the following occur:    Wound reopens or bleeds    Signs of an infection, such as:  ? Increasing redness or swelling around the wound  ? Increased warmth from the wound  ? Worsening pain  ? Red streaking lines away from the wound  ? Fluid draining from the wound    Fever of 100.4 F (38 C) or higher, or as directed by your child's healthcare provider  Date Last Reviewed: 9/27/2015 2000-2017 [employer]. [address] [place] [address], [place] [address]. All rights reserved. This information is not intended as a substitute for professional medical care. Always follow your healthcare professional's instructions.

## 2021-06-19 NOTE — PROGRESS NOTES
"Patient presents at the request of Dr. Bergman for left lower extremity EMG.  He has left lower extremity sense of weakness which is intermittent in the left foot and lateral leg with some numbness over the lateral calf for the past couple of months.  He has a history of foot drop of the right lower extremity.  Has a history of \"herniated disc of the lumbar spine which is treated conservatively although this was not confirmed by his report on imaging.  His father has a history of polyneuropathy.    EMG/NCS  results: Please see scanned full report.    Comment NCS: Abnormal study  1.  Borderline amplitude bilateral lower extremity SNAPs.  2.  Low amplitude left peroneal and tibial CMAPs.  3 normal right peroneal and tibial CMAPs..     Comment EMG: Abnormal study  1.  Increased insertional activity left EDB with small denervation potentials normal motor unit potentials on analysis.  2.  Decreased insertional activity left AH muscle with poor activation.  3.  Prolonged motor unit potential duration right EDB.  4.  Left lower extremity needle EMG proximal to the ankle normal.    Interpretation: Abnormal study    1.  The study is very difficult to interpret.  There are asymmetric findings of low amplitude of the peroneal and tibial  CMAPs.  This is accompanied by denervation potentials of the left EDB with decreased insertional activity of the AH.  There are chronic/long-duration motor units of the right EDB.  No denervation proximal to the left ankle.  This points to a denervation/reinnervation process of the bilateral lower extremities distal to the ankle.      2. There is no electrodiagnostic evidence of lumbosacral radiculopathy, lumbosacral plexopathy, or focal neuropathy in the left lower extremity.    The testing was completed in its entirety by the physician.      It was our pleasure caring for your patient today, if there any questions or concerns please do not hesitate to contact us.        "

## 2021-06-19 NOTE — PROGRESS NOTES
Alan comes in today for evaluation of possible left lumbar radiculopathy.  He states that over the last 6 weeks he has been having some left-sided back pain along with some dysesthesia in the left lower leg.  He denies any shooting pains down the leg, however.  He did have an similar episode about 2 years ago which he was treated with oral steroids for and he recovered nicely from this.  He denies any fevers, bowel problems, or bladder problems.      Objective: Vital signs are as per the EMR.  In general the patient is alert pleasant and in no acute distress.  He appears healthy.    Neurologic: Strength is 5 out of 5 resisted leg flexion, leg extension, ankle plantarflexion, and ankle dorsiflexion.  Deep tendon reflexes in the Achilles and patellar region are 2+ and normal bilaterally.    Assessment and plan: Mild left lumbar radiculopathy.  I am going to give him a 5 day prednisone burst.  If he is not significantly improved by that time I would recommend ordering a lumbar spine MRI.

## 2021-06-19 NOTE — PROGRESS NOTES
ASSESSMENT: Alan Frausto is a 57 y.o. male who presents for consultation at the request of HE PCP Jeffrey Munson MD, with a past medical history significant for Dupuytren's contracture, hyper lipidemia, atrial flutter, hypertension who presents today for new patient evaluation of left-sided back pain with a sensation of weakness and numbness in his left anterior shin and left foot.:    -Patient's physical exam is very reassuring and that he has full symmetrical strength in bilateral lower limbs with normal reflexes at his knees, hamstrings, ankles.  He also has fairly similar sensation in the L4, L5, S1 dermatomes.  With his sensation of weakness and the fact that it is keeping him from doing activities that he enjoys I would like to further investigate this with an EMG nerve conduction study.  Briefly discussed this procedure with him.  With the retrolisthesis I would also like to order flexion-extension x-rays.  We feel that physical therapy with an ongoing home exercise program will be of great benefit to him.    Patient is neurologically intact on exam. No myelopathic or red flag symptoms.     ZACHARY Score: 4    WHO 5: 22     There are no diagnoses linked to this encounter.  PLAN:  Reviewed spine anatomy and disease process. Discussed diagnosis and treatment options with the patient today. A shared decision making model was used.  The patient's values and choices were respected. The following represents what was discussed and decided upon by the provider and the patient.      -DIAGNOSTIC TESTS:  Images were personally reviewed and interpreted and explained to patient today using spine model.   --Lumbar MRI dated 7/26/2018 shows narrowing of the left lateral recess at L4-5 with likely contact with the traversing left L5 nerve root in the lateral recess.  There is also narrowing of the lateral recess at left L5-S1 with probable contact with the traversing left S1 nerve root in the lateral recess.  There are 5  lumbar type vertebral bodies.  A 6.5 mm retrolisthesis of L5 on S1.  There is also mild to moderate facet arthropathy throughout the lumbar spine.  --With the cessation of weakness and instability that has been ongoing for the last 2 years I feel that an EMG nerve conduction study is warranted.  Discussed that this could show the health of his nerves exiting his back.  --I have ordered flexion-extension x-rays he does have the small retrolisthesis of L5 on S1.    -PHYSICAL THERAPY: Ordered [facility] physical therapy for the patient.  For core strengthening and balance type activities.  I would also like a home going exercise program.  Discussed the importance of core strengthening, ROM, stretching exercises with the patient and how each of these entities is important in decreasing pain.  Explained to the patient that the purpose of physical therapy is to teach the patient a home exercise program.  These exercises need to be performed every day in order to decrease pain and prevent future occurrences of pain.        -MEDICATIONS: He is currently taking ibuprofen 800 mg in the morning for the last 2 weeks.  I asked him that if he is not having pain that I would recommend that he not take this.    Discussed multiple medication options today with patient. Discussed risks, side effects, and proper use of medications. Patient verbalized understanding.    -INTERVENTIONS: No interventions were ordered today.    -PATIENT EDUCATION:  45 minutes of total visit time was spent face to face with the patient today, greater than 50% of total time spent with patient was spent on counseling, education, and coordinating care.   -10 minutes spent outside of visit time, non-face-to-face time, reviewing chart.    -FOLLOW-UP:   I will see the patient back in 4 weeks.    Advised patient to call [facility] if symptoms worsen or you have problems controlling bladder and bowel function.    ______________________________________________________________________    SUBJECTIVE:  HPI:  Alan Frausto  Is a 57 y.o. male who presents today for new patient evaluation of low back pain that he has had for the last 20 years.  He has mostly managed this with exercise, occasional ibuprofen, and physical therapy throughout this time period.  In August 2016 he reports that while at work he felt a pop on the right side of his back and thereafter had left-sided leg weakness.  It became so prominent that he was unable to walk for a time period.  He was given 2 Medrol Dosepaks at that time and pain and weakness again to resolve.  For a time he was walking 3 miles a day 5-6 days per week, but he has not been walking for the last 6 weeks because of the sensation of left leg numbness and weakness.  He is also had more lower back discomfort, but that has improved at this point.  He reports that his pain currently in his low back is 2/10.    As he is very unsure about his strength in his left lower extremity he has not been participating in activities that he enjoys including tennis and walking.  He denies any bowel or bladder incontinence, fevers, chills, unintentional weight loss.    -Treatment to Date: Throughout the years he has had physical therapy treatments for his low back, the last one being in 2016.  Currently he is not doing any of the exercises he learned at physical therapy.    -He has had no surgeries or injections to date.    -Medications:    Current Outpatient Prescriptions on File Prior to Encounter   Medication Sig Dispense Refill     chlorthalidone (HYGROTEN) 25 MG tablet Take 1 tablet (25 mg total) by mouth daily. 90 tablet 2     losartan (COZAAR) 50 MG tablet Take 1 tablet (50 mg total) by mouth daily. 90 tablet 2     No current facility-administered medications on file prior to encounter.        No Known Allergies    No past medical history on file.     Patient Active Problem List   Diagnosis      "Dupuytren's Contracture     Hypercholesteremia- CAC 26- 04/11/2018     Atrial Flutter     Hypertension     Adenomatous polyp of colon       No past surgical history on file.    Family History   Problem Relation Age of Onset     Macular degeneration Father        Reviewed past medical, surgical, and family history with patient found on new patient intake packet located in [employer] tab.     SOCIAL HX: He is a non-smoker, drinks alcohol occasionally, and is not use recreational drugs.  He has a [employer] business and is .    ROS:  Specifically negative for bowel/bladder dysfunction, headache, dizziness, foot drop, fevers, chills, appetite changes, nausea/vomiting, unexplained weight loss. Otherwise 13 systems reviewed are negative. Please see the patient's intake questionnaire from today for details.    OBJECTIVE:  /86  Pulse 71  Temp 98.2  F (36.8  C) (Oral)   Resp 19  Ht 6' 2.75\" (1.899 m)  Wt 209 lb 4.8 oz (94.9 kg)  SpO2 92%  BMI 26.34 kg/m2    PHYSICAL EXAMINATION:    --CONSTITUTIONAL:  Vital signs as above.  No acute distress.  The patient is well nourished and well groomed.  --PSYCHIATRIC:  Appropriate mood and affect. The patient is awake, alert, oriented to person, place, time and answering questions appropriately with clear speech.    --SKIN:  Skin over the face, bilateral lower extremities, and posterior torso is clean, dry, intact without rashes.    --RESPIRATORY: Normal rhythm and effort. No abnormal accessory muscle breathing patterns noted.   --ABDOMINAL:  Soft, non-distended, non-tender throughout all quadrants.   --STANDING EXAMINATION:  Normal lumbar lordosis noted, no lateral shift.  --MUSCULOSKELETAL: Lumbar spine inspection reveals no evidence of deformity. Range of motion is not limited in lumbar flexion, extension, lateral rotation.  Mild tenderness tenderness to palpation the right lumbar paraspinal muscles. Straight leg raising in the supine position is negative to " radicular pain.   --SACROILIAC JOINT: Negative distraction.  Negative Monet's with reproduction of pain to affected extremity.   --GROSS MOTOR: Gait is non-antalgic. Easily arises from a seated position. Toe walking and heel walking are normal without significant difficulty.  He was also able to do 15 single leg toe raises bilaterally.  --LOWER EXTREMITY MOTOR TESTING:  Plantar flexion left 5/5, right 5/5   Dorsiflexion left 5/5, right 5/5   Great toe MTP extension left 5/5, right 5/5  Knee flexion left 5/5, right 5/5  Knee extension left 5/5, right 5/5   Hip flexion left 5/5, right 5/5  Hip abduction left 5/5, right 5/5  Hip adduction left 5/5, right 5/5   --HIPS: Full range of motion bilaterally. Negative FABERs on the involved lower extremity.   --NEUROLOGICAL:  2/4 patellar, medial hamstring, and achilles reflexes bilaterally.  Sensation to light touch is intact in the bilateral L4, L5, and S1 dermatomes. Babinski is negative. No clonus.  Negative Hanna reflex bilaterally.  --VASCULAR:  2/4 dorsalis pedis and posterior tibialsi pulses bilaterally.  Bilateral lower extremities are warm.  There is no pitting edema of the bilateral lower extremities.    RESULTS: Prior medical records from [facility] internal medicine.    Imaging: Lumbar spine Imaging was personally reviewed and interpreted today. The images were shown to the patient and the findings were explained using a spine model.      Mr Lumbar Spine Without Contrast    Result Date: 7/30/2018  [facility] MR LUMBAR SPINE WO CONTRAST 7/30/2018 7:05 AM INDICATION: He states that over the last 6 weeks he has been having some left-sided back pain along with some dysesthesia in the left lower leg. TECHNIQUE: Without IV contrast. CONTRAST: None. COMPARISON: None. FINDINGS: Nomenclature is based on 5 lumbar type vertebral bodies. The conus ends at T12-L1. There is mild dextrocurvature of the lumbar spine and levocurvature of the thoracolumbar junction. 6.5  mm retrolisthesis of L5 on S1. Vertebral body heights are maintained. Modic type II changes at L3-L4 and L5-S1. There are scattered small foci of focal fat or hemangioma throughout the lumbar spine. No MRI evidence for pars defects. The paraspinal soft tissues are grossly within normal limits. Normal diameter of the distal abdominal aorta. No abnormal marrow edema. The visualized bony pelvis and proximal femora are grossly within normal limits. T12-L1: Normal disc height and signal. No herniation. No facet arthropathy. No spinal canal stenosis. No right neural foraminal stenosis. No left neural foraminal stenosis. L1-L2: Normal intervertebral disc height. Normal intervertebral disc signal. Small right paracentral disc protrusion. Mild bilateral facet arthropathy. Narrowing of the right lateral recess. No spinal canal narrowing. No neural foraminal narrowing. L2-L3: Normal intervertebral disc height. Loss of the normal T2 signal within the disc. There is a small broad-based disc bulge with associated annular fissure. Mild bilateral facet arthropathy. No spinal canal narrowing. Encroachment of the inferior right neural foramen without significant right neural foraminal narrowing. No left neural foraminal narrowing. L3-L4: Mild intervertebral disc height loss. Loss of the normal T2 signal within the disc. Small broad-based disc bulge. Mild to moderate bilateral facet arthropathy. No spinal canal narrowing. Mild right neural foraminal narrowing. No left neural foraminal narrowing. L4-L5: Normal intervertebral disc height. Loss of the normal T2 signal within the disc. There is a small broad-based disc bulge. Moderate bilateral facet arthropathy. Minimal amount of fluid within the facet joints. Narrowing of the left lateral recess. There is contact with the traversing left L5 nerve root in the lateral recess. No spinal canal narrowing. Mild right neural foraminal narrowing. Mild left neural foraminal narrowing. L5-S1:  Moderate intervertebral disc height loss. Loss of the normal T2 signal within the disc. Circumferential disc osteophyte complex. Mild to moderate bilateral facet arthropathy. No spinal canal narrowing. Narrowing of the left lateral recess. There is contact with the traversing left S1 nerve root in the lateral recess. No right neural foraminal narrowing. Mild left neural foraminal narrowing.     CONCLUSION: 1.  Narrowing of the left lateral recess at L4-L5 with probable contact with the traversing left L5 nerve root in the lateral recess. 2.  Narrowing of the left lateral recess at L5-S1 with probable contact with the traversing left S1 nerve root in the lateral recess. 3.  No high-grade spinal canal narrowing. 4.  Mild bilateral neural foraminal narrowing at L4-L5. Mild right neural foraminal narrowing at L3-L4. Mild left neural foraminal narrowing at L5-S1.

## 2021-06-20 NOTE — PROGRESS NOTES
Assessment:     Diagnoses and all orders for this visit:    Lumbar spondylosis    Peroneal neuropathy     Alan Frausto is a 57 y.o. y.o. male with past medical history significant for Dupuytren's contracture, hyper lipidemia, atrial flutter, hypertension  who presents today for follow-up regarding left-sided low back pain with a sensation of weakness and numbness in left anterior shin and left foot:    -Overall the patient is doing much better as he is restarted doing his home exercise program and focusing on core strengthening.  He is also restarted his walking program is walking 3 miles 6 days a week.  I do believe that EMG findings can be explained from his habit of crossing legs which he has discontinued.  His physical exam is again very reassuring that he has normal strength and reflexes.  His back pain can be explained from facet arthropathy, which seems to be doing better with his home exercise program.     Plan:     A shared decision making plan was used. The patient's values and choices were respected. Prior medical records from her last visit on 8/9/2018 as well as EMG done by Dr. Forrester on 8/20/2018 were reviewed today. The following represents what was discussed and decided upon by the provider and the patient.        -DIAGNOSTIC TESTS: Images were personally reviewed and interpreted.   --Lumbar MRI dated 7/26/2018 shows narrowing of the left lateral recess at L4-5 with likely contact with the traversing left L5 nerve root in the lateral recess.  There is also narrowing of the lateral recess at left L5-S1 with probable contact with the traversing left S1 nerve root in the lateral recess.  There are 5 lumbar type vertebral bodies.  A 6.5 mm retrolisthesis of L5 on S1.  There is also mild to moderate facet arthropathy throughout the lumbar spine.  --Lumbar flexion extension x-ray dated 8/9/2018 shows a retrolisthesis at L2-3 measuring 2 mm and at L1-1 millimeter there is no movement in flexion or  extension.  There is moderate to severe changes at L5-S1 with intervertebral disc height loss.  Is also mild to moderate facet joint arthropathy at L4-5 and L5-S1 bilaterally.  --Nerve conduction studies and EMG done on 8/20/2018 report was reviewed which showed asymmetric findings of low amplitude of peroneal and tibial CMAPs.  There is also denervation potentials of the left EDP with decreased insertional activity of the AH.  There are chronic long-duration motor units of the right EDB.  This points to a denervation reinnervation process of bilateral lower extremities distal to the ankle.  There is no electricodiagnostic evidence of lumbosacral radiculopathy, lumbosacral plexopathy, or focal neuropathy in the left lower extremity.  I do believe that the EMG findings can be explained from his habit of crossing his legs.  We discussed compression neuropathy of the peroneal nerve.  He reports that he has stopped crossing his legs and does feel that he has improved strength in bilateral limbs.    -INTERVENTIONS: No interventions at this time.    -MEDICATIONS: Patient can take ibuprofen 400 mg up to 3-4 times daily as needed.  At this time he is not taking any medications for pain.    Discussed side effects of medications and proper use. Patient verbalized understanding.    -PHYSICAL THERAPY: The patient is done a wonderful job of doing his home exercise program the last month.  He notes many improvements in his low back pain.  I encouraged the patient to continue doing his home exercise program even when he is not having pain, especially as he has had bouts of back pain for some time.    -PATIENT EDUCATION: We discussed the importance of continued home exercise program.  We also discussed if feelings of weakness in his left lower limb should worsen that he could call for a follow-up visit.    -FOLLOW UP: I am happy to see the patient had any time.  He will call as needed.  Advised to contact clinic if symptoms worsen  or change.    Subjective:     Alan Frausto is a 57 y.o. male who presents today for follow-up regarding left-sided low back pain and left anterior shin and foot weakness.  The patient reports that he has been doing his home exercise program diligently and feels that this has helped immensely.  He was also very encouraged to find that his low back was stable on flexion-extension x-rays and gave him the confidence to continue his daily routine including exercise.  Patient reports in the past he has had dropfoot on the right side and does note that he for many hours would cross his ankles on his desk in the past.  He has stopped this now and has noticed improvement in both lower limbs.  Currently the patient has 0 out of 10 pain in his low back.  He does find that he will have up to 4 out of 10 pain when he is doing yard work however pain is eliminated quickly with stretching and home exercise program.  At this time the patient denies any numbness/tingling, loss of control of bowel or bladder, foot drop, weakness, headache, dizziness, nausea, vomiting, blurry vision, balance changes.    Evaluation to Date: MRI 7/30/2018 of lumbar spine, flexion-extension x-rays of the lumbar spine on 8/9/2018, EMG nerve conduction study on 8/20/2018    Treatment to Date: He has had physical therapy in the past and is continuing to do home exercise program.  He has had no injections nor surgeries.    Patient Active Problem List   Diagnosis     Dupuytren's Contracture     Hypercholesteremia- CAC 26- 04/11/2018     Atrial Flutter     Hypertension     Adenomatous polyp of colon       Current Outpatient Prescriptions on File Prior to Encounter   Medication Sig Dispense Refill     chlorthalidone (HYGROTEN) 25 MG tablet Take 1 tablet (25 mg total) by mouth daily. 90 tablet 2     losartan (COZAAR) 50 MG tablet Take 1 tablet (50 mg total) by mouth daily. 90 tablet 2     multivitamin therapeutic tablet Take 1 tablet by mouth daily.        SHINGRIX, PF, 50 mcg/0.5 mL SusR TO BE ADMINISTERED BY PHARMACIST FOR IMMUNIZATION  0     ibuprofen (ADVIL,MOTRIN) 200 MG tablet Take 800 mg by mouth daily.       No current facility-administered medications on file prior to encounter.        No Known Allergies    Review of Systems  ROS:  Specifically negative for bowel/bladder dysfunction, balance changes, headache, dizziness, foot drop, fevers, chills, appetite changes, nausea/vomiting, unexplained weight loss. Otherwise 13 systems reviewed are negative. Please see the patient's intake questionnaire from today for details.    Reviewed Social, Family, Past Medical and Past Surgical history with patient, no significant changes noted since prior visit.     Objective:     /80 (Patient Site: Left Arm, Patient Position: Sitting, Cuff Size: Adult Regular)  Pulse 77  Temp 98.5  F (36.9  C) (Oral)   Resp 18  Wt 214 lb 6.4 oz (97.3 kg)  SpO2 96%  BMI 26.98 kg/m2    PHYSICAL EXAMINATION:    --CONSTITUTIONAL: Well developed, well nourished, healthy appearing individual.  --PSYCHIATRIC: Appropriate mood and affect. No difficulty interacting due to temper, social withdrawal, or memory issues.  --SKIN: Lumbar region is dry and intact.   --RESPIRATORY: Normal rhythm and effort. No abnormal accessory muscle breathing patterns noted.   --MUSCULOSKELETAL:  Normal lumbar lordosis noted, no lateral shift.  --GROSS MOTOR: Easily arises from a seated position. Gait is non-antalgic  --LUMBAR SPINE:  Inspection reveals no evidence of deformity. Range of motion is not limited in lumbar flexion, extension, lateral rotation. No tenderness to palpation lumbar spine. Straight leg raising in the supine position is negative to radicular pain. Sciatic notch non-tender.   --SACROILIAC JOINT: Negative distraction.  Negative Monet's with reproduction of pain to affected extremity. One Finger point test negative.  --LOWER EXTREMITY MOTOR TESTING:  Plantar flexion left 5/5, right 5/5    Dorsiflexion left 5/5, right 5/5   Great toe MTP extension left 5/5, right 5/5  Knee flexion left 5/5, right 5/5  Knee extension left 5/5, right 5/5   Hip flexion left 5/5, right 5/5  Hip abduction left 5/5, right 5/5  Hip adduction left 5/5, right 5/5   --HIPS: Full range of motion bilaterally.  Stinchfield test is negative bilaterally.  --NEUROLOGIC: Bilateral patellar and achilles reflexes are physiologic and symmetric. Sensation to light touch is intact in the bilateral L4, L5, and S1 dermatomes.    RESULTS:   Imaging: Lumbar spine imaging was reviewed today. The images were shown to the patient and the findings were explained using a spine model.    Xr Lumbar Spine 4 Or More Vws    Result Date: 8/9/2018  XR LUMBAR SPINE 4 OR MORE VWS 8/9/2018 10:52 AM INDICATION: Spondylosis without myelopathy or radiculopathy, lumbar region COMPARISON: None. FINDINGS: Nomenclature presumes 5 lumbar type vertebral bodies. Vertebral body heights are unremarkable. There is moderate L4-5 and L5-S1 facet arthropathy. Spondylosis is characterized by intervertebral disc height loss and endplate osteophyte formation. These changes are moderate to severe at L5-S1. There is mild intervertebral disc height loss at L3-4, also likely reflecting mild spondylosis. In the neutral position, L2-3 retrolisthesis measures 2 mm and L1-2 retrolisthesis measures 1 mm. In the extended position, L2-3 retrolisthesis measures 2 mm and L1-2 retrolisthesis measures 1 mm. In the flexed position, L1-2 retrolisthesis measures 1 mm. L2-3 retrolisthesis resolves.

## 2021-06-23 NOTE — TELEPHONE ENCOUNTER
Former patient of Jeimy & has not established care with another provider.  Please assign refill request to covering provider per Clinic standard process.      RN cannot approve Refill Request    RN can NOT refill this medication PCP messaged that patient is overdue for Office Visit.   chlorthalidone (HYGROTEN) 25 MG tablet 30 tablet 0 12/4/2018     Sig - Route: TAKE 1 TABLET (25 MG TOTAL) BY MOUTH DAILY. - Oral    Sent to pharmacy as: chlorthalidone (HYGROTEN) 25 MG tablet    Notes to Pharmacy: NO MORE REFILLS UNTIL SEEN            Emi Medina, [facility] Connection Triage/Med Refill 1/10/2019    Requested Prescriptions   Pending Prescriptions Disp Refills     chlorthalidone (HYGROTEN) 25 MG tablet [Pharmacy Med Name: CHLORTHALIDONE 25 MG TABLET] 30 tablet 0     Sig: TAKE 1 TABLET BY MOUTH EVERY DAY    Diuretics/Combination Diuretics Refill Protocol  Passed - 1/10/2019  2:06 AM       Passed - Visit with PCP or prescribing provider visit in past 12 months    Last office visit with prescriber/PCP: Visit date not found OR same dept: 7/20/2018 Maurizio Ellison MD OR same specialty: 7/20/2018 Maurizio Ellison MD  Last physical: Visit date not found Last MTM visit: Visit date not found   Next visit within 3 mo: Visit date not found  Next physical within 3 mo: Visit date not found  Prescriber OR PCP: Te Young CNP  Last diagnosis associated with med order: 1. Essential hypertension  - chlorthalidone (HYGROTEN) 25 MG tablet [Pharmacy Med Name: CHLORTHALIDONE 25 MG TABLET]; TAKE 1 TABLET BY MOUTH EVERY DAY  Dispense: 30 tablet; Refill: 0  - losartan (COZAAR) 50 MG tablet [Pharmacy Med Name: LOSARTAN POTASSIUM 50 MG TAB]; TAKE 1 TABLET BY MOUTH EVERY DAY  Dispense: 30 tablet; Refill: 0    If protocol passes may refill for 12 months if within 3 months of last provider visit (or a total of 15 months).            Passed - Serum Potassium in past 12 months     Lab Results   Component Value Date    Potassium 3.9  03/16/2018            Passed - Serum Sodium in past 12 months     Lab Results   Component Value Date    Sodium 136 03/16/2018            Passed - Blood pressure on file in past 12 months    BP Readings from Last 1 Encounters:   09/06/18 132/80            Passed - Serum Creatinine in past 12 months     Creatinine   Date Value Ref Range Status   03/16/2018 0.81 0.70 - 1.30 mg/dL Final             losartan (COZAAR) 50 MG tablet [Pharmacy Med Name: LOSARTAN POTASSIUM 50 MG TAB] 30 tablet 0     Sig: TAKE 1 TABLET BY MOUTH EVERY DAY    Angiotensin Receptor Blocker Protocol Passed - 1/10/2019  2:06 AM       Passed - PCP or prescribing provider visit in past 12 months      Last office visit with prescriber/PCP: Visit date not found OR same dept: 7/20/2018 Maurizio Ellison MD OR same specialty: 7/20/2018 Maurizio Ellison MD  Last physical: Visit date not found Last MTM visit: Visit date not found   Next visit within 3 mo: Visit date not found  Next physical within 3 mo: Visit date not found  Prescriber OR PCP: Te Young CNP  Last diagnosis associated with med order: 1. Essential hypertension  - chlorthalidone (HYGROTEN) 25 MG tablet [Pharmacy Med Name: CHLORTHALIDONE 25 MG TABLET]; TAKE 1 TABLET BY MOUTH EVERY DAY  Dispense: 30 tablet; Refill: 0  - losartan (COZAAR) 50 MG tablet [Pharmacy Med Name: LOSARTAN POTASSIUM 50 MG TAB]; TAKE 1 TABLET BY MOUTH EVERY DAY  Dispense: 30 tablet; Refill: 0    If protocol passes may refill for 12 months if within 3 months of last provider visit (or a total of 15 months).            Passed - Serum potassium within the past 12 months    Lab Results   Component Value Date    Potassium 3.9 03/16/2018            Passed - Blood pressure filed in past 12 months    BP Readings from Last 1 Encounters:   09/06/18 132/80            Passed - Serum creatinine within the past 12 months    Creatinine   Date Value Ref Range Status   03/16/2018 0.81 0.70 - 1.30 mg/dL Final

## 2021-06-23 NOTE — TELEPHONE ENCOUNTER
RN cannot approve Refill Request    RN can NOT refill this medication former pt of Dr. Munson . Last office visit: 7/20/2018 Maurizio Ellison MD Last Physical: Visit date not found Last MTM visit: Visit date not found Last visit same specialty: 7/20/2018 Maurizio Ellison MD.  Next visit within 3 mo: 2/7/2019 Maurizio Ellison MD  Next physical within 3 mo: Visit date not found      Yordy Medina, [facility] Triage/Med Refill 2/9/2019    Requested Prescriptions   Pending Prescriptions Disp Refills     chlorthalidone (HYGROTEN) 25 MG tablet [Pharmacy Med Name: CHLORTHALIDONE 25 MG TABLET] 30 tablet 0     Sig: TAKE 1 TABLET BY MOUTH EVERY DAY    Diuretics/Combination Diuretics Refill Protocol  Passed - 2/8/2019 11:08 AM       Passed - Visit with PCP or prescribing provider visit in past 12 months    Last office visit with prescriber/PCP: 7/20/2018 Maurizio Ellison MD OR same dept: 7/20/2018 Maurizio Ellison MD OR same specialty: 7/20/2018 Maurizio Ellison MD  Last physical: Visit date not found Last MTM visit: Visit date not found   Next visit within 3 mo: 2/7/2019 Maurizio Ellison MD  Next physical within 3 mo: Visit date not found  Prescriber OR PCP: Maurizio Ellison MD  Last diagnosis associated with med order: 1. Essential hypertension  - chlorthalidone (HYGROTEN) 25 MG tablet [Pharmacy Med Name: CHLORTHALIDONE 25 MG TABLET]; TAKE 1 TABLET BY MOUTH EVERY DAY  Dispense: 30 tablet; Refill: 0  - losartan (COZAAR) 50 MG tablet [Pharmacy Med Name: LOSARTAN POTASSIUM 50 MG TAB]; TAKE 1 TABLET BY MOUTH EVERY DAY  Dispense: 30 tablet; Refill: 0    If protocol passes may refill for 12 months if within 3 months of last provider visit (or a total of 15 months).            Passed - Serum Potassium in past 12 months     Lab Results   Component Value Date    Potassium 4.0 02/07/2019            Passed - Serum Sodium in past 12 months     Lab Results   Component Value Date     Sodium 136 02/07/2019            Passed - Blood pressure on file in past 12 months    BP Readings from Last 1 Encounters:   02/07/19 (!) 148/98            Passed - Serum Creatinine in past 12 months     Creatinine   Date Value Ref Range Status   02/07/2019 0.73 0.70 - 1.30 mg/dL Final             losartan (COZAAR) 50 MG tablet [Pharmacy Med Name: LOSARTAN POTASSIUM 50 MG TAB] 30 tablet 0     Sig: TAKE 1 TABLET BY MOUTH EVERY DAY    Angiotensin Receptor Blocker Protocol Passed - 2/8/2019 11:08 AM       Passed - PCP or prescribing provider visit in past 12 months      Last office visit with prescriber/PCP: 7/20/2018 Maurizio Ellison MD OR same dept: 7/20/2018 Maurizio Ellison MD OR same specialty: 7/20/2018 Maurizio Ellison MD  Last physical: Visit date not found Last MTM visit: Visit date not found   Next visit within 3 mo: 2/7/2019 Maurizio Ellison MD  Next physical within 3 mo: Visit date not found  Prescriber OR PCP: Maurizio Ellison MD  Last diagnosis associated with med order: 1. Essential hypertension  - chlorthalidone (HYGROTEN) 25 MG tablet [Pharmacy Med Name: CHLORTHALIDONE 25 MG TABLET]; TAKE 1 TABLET BY MOUTH EVERY DAY  Dispense: 30 tablet; Refill: 0  - losartan (COZAAR) 50 MG tablet [Pharmacy Med Name: LOSARTAN POTASSIUM 50 MG TAB]; TAKE 1 TABLET BY MOUTH EVERY DAY  Dispense: 30 tablet; Refill: 0    If protocol passes may refill for 12 months if within 3 months of last provider visit (or a total of 15 months).            Passed - Serum potassium within the past 12 months    Lab Results   Component Value Date    Potassium 4.0 02/07/2019            Passed - Blood pressure filed in past 12 months    BP Readings from Last 1 Encounters:   02/07/19 (!) 148/98            Passed - Serum creatinine within the past 12 months    Creatinine   Date Value Ref Range Status   02/07/2019 0.73 0.70 - 1.30 mg/dL Final

## 2021-06-23 NOTE — TELEPHONE ENCOUNTER
New Appointment Needed  What is the reason for the visit:    Establish care physical.   Provider Preference: Dr. Maurizio Ellison  How soon do you need to be seen?: February or March. Prefers AM. Patient originally scheduled on 3/18 at 8:00 AM, but Dr. Ellison will now be out that week. Next available is not until July. Patient cannot have any more refills of his medication until he is seen. Can Dr. Ellison fit patient in sooner than July? Please advise, thank you.   Waitlist offered?: No  Okay to leave a detailed message:  Yes

## 2021-06-23 NOTE — TELEPHONE ENCOUNTER
Patient has appointment scheduled with SHIRA Ellison 8/8/19.   Routing refill.  Kat Ledezma CMA ............... 9:26 AM, 02/11/19

## 2021-06-23 NOTE — PROGRESS NOTES
Alan comes in today for follow-up of his chronic medical conditions.  He is feeling well today and has no acute complaints.  He is going to establish care with me in August 2019.  He is currently on both chlorthalidone and losartan for his blood pressure.  Blood pressure is a bit elevated today, but the roads are incredibly slick today and he has a big job interview coming up in just a couple of hours.  He has a history of atrial flutter but is currently asymptomatic from this.  A complete review of systems was otherwise undertaken and negative.    Objective: Vital signs are as per the EMR.  In general the patient is alert pleasant and in no acute distress.  He appears healthy.    Assessment and plan:    1.  Hypertension, uncontrolled.  He is going to take this on a daily basis at home.  We will contact him in a week to see how his blood pressures are.  If the are persistently elevated he will need to come back in again for a blood pressure recheck.  Check a CMP.    2.  History of atrial flutter, stable.    3.  Healthcare maintenance.  Again he is going to come in in August 2019 for a complete physical exam.

## 2021-06-24 NOTE — TELEPHONE ENCOUNTER
LMTCB - please ask pt if he has been taking his bp readings the past two weeks and if so what his numbers have been - if they have been elevated please make appointment to see BLU Salvador CMA  8:56 AM  2/18/2019

## 2021-06-24 NOTE — TELEPHONE ENCOUNTER
----- Message from Laura Ellison CMA sent at 2/7/2019  9:51 AM CST -----  Call pt and ask about BP readings

## 2021-06-26 ENCOUNTER — HEALTH MAINTENANCE LETTER (OUTPATIENT)
Age: 61
End: 2021-06-26

## 2021-09-13 ENCOUNTER — OFFICE VISIT (OUTPATIENT)
Dept: INTERNAL MEDICINE | Facility: CLINIC | Age: 61
End: 2021-09-13
Payer: COMMERCIAL

## 2021-09-13 VITALS
HEIGHT: 75 IN | HEART RATE: 60 BPM | BODY MASS INDEX: 28.04 KG/M2 | DIASTOLIC BLOOD PRESSURE: 88 MMHG | SYSTOLIC BLOOD PRESSURE: 142 MMHG | WEIGHT: 225.5 LBS | OXYGEN SATURATION: 95 %

## 2021-09-13 DIAGNOSIS — I48.0 PAROXYSMAL ATRIAL FIBRILLATION (H): ICD-10-CM

## 2021-09-13 DIAGNOSIS — Z00.00 ROUTINE GENERAL MEDICAL EXAMINATION AT A HEALTH CARE FACILITY: Primary | ICD-10-CM

## 2021-09-13 DIAGNOSIS — I10 ESSENTIAL HYPERTENSION: ICD-10-CM

## 2021-09-13 DIAGNOSIS — E78.00 HYPERCHOLESTEREMIA: ICD-10-CM

## 2021-09-13 LAB
ALBUMIN SERPL-MCNC: 4.1 G/DL (ref 3.5–5)
ALP SERPL-CCNC: 66 U/L (ref 45–120)
ALT SERPL W P-5'-P-CCNC: 33 U/L (ref 0–45)
ANION GAP SERPL CALCULATED.3IONS-SCNC: 14 MMOL/L (ref 5–18)
AST SERPL W P-5'-P-CCNC: 39 U/L (ref 0–40)
BILIRUB SERPL-MCNC: 0.5 MG/DL (ref 0–1)
BUN SERPL-MCNC: 13 MG/DL (ref 8–22)
CALCIUM SERPL-MCNC: 9.2 MG/DL (ref 8.5–10.5)
CHLORIDE BLD-SCNC: 97 MMOL/L (ref 98–107)
CHOLEST SERPL-MCNC: 200 MG/DL
CO2 SERPL-SCNC: 26 MMOL/L (ref 22–31)
CREAT SERPL-MCNC: 0.75 MG/DL (ref 0.7–1.3)
FASTING STATUS PATIENT QL REPORTED: NO
GFR SERPL CREATININE-BSD FRML MDRD: >90 ML/MIN/1.73M2
GLUCOSE BLD-MCNC: 92 MG/DL (ref 70–125)
HDLC SERPL-MCNC: 70 MG/DL
LDLC SERPL CALC-MCNC: 102 MG/DL
POTASSIUM BLD-SCNC: 4.1 MMOL/L (ref 3.5–5)
PROT SERPL-MCNC: 7.2 G/DL (ref 6–8)
PSA SERPL-MCNC: 1.2 UG/L (ref 0–4.5)
SODIUM SERPL-SCNC: 137 MMOL/L (ref 136–145)
TRIGL SERPL-MCNC: 141 MG/DL

## 2021-09-13 PROCEDURE — 99396 PREV VISIT EST AGE 40-64: CPT | Performed by: INTERNAL MEDICINE

## 2021-09-13 PROCEDURE — 99214 OFFICE O/P EST MOD 30 MIN: CPT | Mod: 25 | Performed by: INTERNAL MEDICINE

## 2021-09-13 PROCEDURE — 36415 COLL VENOUS BLD VENIPUNCTURE: CPT | Performed by: INTERNAL MEDICINE

## 2021-09-13 PROCEDURE — 80053 COMPREHEN METABOLIC PANEL: CPT | Performed by: INTERNAL MEDICINE

## 2021-09-13 PROCEDURE — 80061 LIPID PANEL: CPT | Performed by: INTERNAL MEDICINE

## 2021-09-13 PROCEDURE — G0103 PSA SCREENING: HCPCS | Performed by: INTERNAL MEDICINE

## 2021-09-13 ASSESSMENT — MIFFLIN-ST. JEOR: SCORE: 1900.55

## 2021-09-13 NOTE — PROGRESS NOTES
SUBJECTIVE:   CC: Alan Frausto is an 60 year old male who presents for preventative health visit.  He is feeling well today and has no acute complaints.  His past medical history above and beyond his physical exam was reviewed and is outlined as below.    History of hypertension, on losartan and metoprolol.  Blood pressure is just barely above goal today.  He will be due for labs.     Hyperlipidemia, currently on simvastatin.  Check lipids as above.     History of atrial fibrillation.  He saw his cardiologist approximately 2 months ago who recommended Xarelto.  After a discussion of the risks and benefits with him Alan has elected to stay off of the Xarelto and use a baby aspirin.  His chads 2 score is 1 and thus he is in an intermediate risk profile.      Patient has been advised of split billing requirements and indicates understanding: Yes  Healthy Habits:     Getting at least 3 servings of Calcium per day:  Yes    Bi-annual eye exam:  Yes    Dental care twice a year:  Yes    Sleep apnea or symptoms of sleep apnea:  None    Diet:  Regular (no restrictions)    Frequency of exercise:  2-3 days/week    Duration of exercise:  15-30 minutes    Taking medications regularly:  Yes    Medication side effects:  None    PHQ-2 Total Score: 0    Additional concerns today:  No        Today's PHQ-2 Score:   PHQ-2 ( 1999 [employer]) 9/10/2021   Q1: Little interest or pleasure in doing things 0   Q2: Feeling down, depressed or hopeless 0   PHQ-2 Score 0   Q1: Little interest or pleasure in doing things Not at all   Q2: Feeling down, depressed or hopeless Not at all   PHQ-2 Score 0       Abuse: Current or Past(Physical, Sexual or Emotional)- No  Do you feel safe in your environment? Yes        Social History     Tobacco Use     Smoking status: Never Smoker     Smokeless tobacco: Never Used   Substance Use Topics     Alcohol use: Yes     Alcohol/week: 6.0 - 8.0 standard drinks     If you drink alcohol do you typically have >3  "drinks per day or >7 drinks per week? Yes        AUDIT - Alcohol Use Disorders Identification Test - Reproduced from the World Health Organization Audit 2001 (Second Edition) 9/10/2021   1.  How often do you have a drink containing alcohol? 4 or more times a week   2.  How many drinks containing alcohol do you have on a typical day when you are drinking? 3 or 4   3.  How often do you have five or more drinks on one occasion? Less than monthly   4.  How often during the last year have you found that you were not able to stop drinking once you had started? Never   5.  How often during the last year have you failed to do what was normally expected of you because of drinking? Never   6.  How often during the last year have you needed a first drink in the morning to get yourself going after a heavy drinking session? Never   7.  How often during the last year have you had a feeling of guilt or remorse after drinking? Never   8.  How often during the last year have you been unable to remember what happened the night before because of your drinking? Never   9.  Have you or someone else been injured because of your drinking? No   10. Has a relative, friend, doctor or other health care worker been concerned about your drinking or suggested you cut down? No   TOTAL SCORE 6       Last PSA:   Prostate Specific Antigen Screen   Date Value Ref Range Status   08/27/2020 0.9 0.00 - 4.50 ng/mL Final     Reviewed and updated as needed this visit by clinical staff  Tobacco  Allergies  Meds              Reviewed and updated as needed this visit by Provider      OBJECTIVE:   BP (!) 142/88 (BP Location: Right arm, Patient Position: Sitting, Cuff Size: Adult Large)   Pulse 60   Ht 1.892 m (6' 2.5\")   Wt 102.3 kg (225 lb 8 oz)   SpO2 95%   BMI 28.57 kg/m      Physical Exam  GENERAL: healthy, alert and no distress  EYES: Eyes grossly normal to inspection, PERRL and conjunctivae and sclerae normal  HENT: ear canals and TM's normal, " "nose and mouth without ulcers or lesions  NECK: no adenopathy, no asymmetry, masses, or scars and thyroid normal to palpation  RESP: lungs clear to auscultation - no rales, rhonchi or wheezes  CV: regular rate and rhythm, normal S1 S2, no S3 or S4, no murmur, click or rub, no peripheral edema and peripheral pulses strong  ABDOMEN: soft, nontender, no hepatosplenomegaly, no masses and bowel sounds normal  MS: no gross musculoskeletal defects noted, no edema  SKIN: no suspicious lesions or rashes  NEURO: Normal strength and tone, mentation intact and speech normal  PSYCH: mentation appears normal, affect normal/bright    Diagnostic Test Results:  Labs reviewed in Epic    ASSESSMENT/PLAN:   (Z00.00) Routine general medical examination at a health care facility  (primary encounter diagnosis)  Comment: Vaccinations are up-to-date.  He has had both Covid vaccines.  He will be due for colonoscopy in 2023.  Check labs as below.  Follow-up 1 year, earlier if needed.  Plan: PSA, screen            (E78.00) Hypercholesteremia- CAC 26- 04/11/2018  Comment: Stable.  Continue simvastatin.  Labs as below.  Plan: Lipid Profile (Chol, Trig, HDL, LDL calc)            (I10) Hypertension  Comment: Stable.  Labs as below.  Plan: Comprehensive metabolic panel (BMP + Alb, Alk         Phos, ALT, AST, Total. Bili, TP)           (I48.0) Paroxysmal atrial fibrillation (H)  Comment: Stable.  Continue full strength aspirin as per his preference  Plan:     Patient has been advised of split billing requirements and indicates understanding: Yes      Estimated body mass index is 28.57 kg/m  as calculated from the following:    Height as of this encounter: 1.892 m (6' 2.5\").    Weight as of this encounter: 102.3 kg (225 lb 8 oz).         He reports that he has never smoked. He has never used smokeless tobacco.      Counseling Resources:  ATP IV Guidelines  Pooled Cohorts Equation Calculator  FRAX Risk Assessment  ICSI Preventive Guidelines  Dietary " Guidelines for Americans, 2010  USDA's MyPlate  ASA Prophylaxis  Lung CA Screening    Maurizio Ellison MD  [facility]

## 2021-10-16 ENCOUNTER — HEALTH MAINTENANCE LETTER (OUTPATIENT)
Age: 61
End: 2021-10-16

## 2021-11-28 DIAGNOSIS — I10 ESSENTIAL HYPERTENSION: ICD-10-CM

## 2021-11-30 NOTE — TELEPHONE ENCOUNTER
"Routing refill request to provider for review/approval because:  BP out of range    Last Written Prescription Date:  9/15/20  Last Fill Quantity: 90,  # refills: 3   Last office visit provider:  9/13/21     Requested Prescriptions   Pending Prescriptions Disp Refills     losartan (COZAAR) 50 MG tablet [Pharmacy Med Name: LOSARTAN POTASSIUM 50 MG TAB] 90 tablet 3     Sig: TAKE 1 TABLET BY MOUTH EVERY DAY       Angiotensin-II Receptors Failed - 11/28/2021  7:12 AM        Failed - Last blood pressure under 140/90 in past 12 months     BP Readings from Last 3 Encounters:   09/13/21 (!) 142/88   08/27/20 132/89   07/18/20 138/77                 Passed - Recent (12 mo) or future (30 days) visit within the authorizing provider's specialty     Patient has had an office visit with the authorizing provider or a provider within the authorizing providers department within the previous 12 mos or has a future within next 30 days. See \"Patient Info\" tab in inbasket, or \"Choose Columns\" in Meds & Orders section of the refill encounter.              Passed - Medication is active on med list        Passed - Patient is age 18 or older        Passed - Normal serum creatinine on file in past 12 months     Recent Labs   Lab Test 09/13/21  1440   CR 0.75       Ok to refill medication if creatinine is low          Passed - Normal serum potassium on file in past 12 months     Recent Labs   Lab Test 09/13/21  1440   POTASSIUM 4.1                         Soumya Oro RN 11/29/21 8:27 PM  "

## 2021-12-01 RX ORDER — LOSARTAN POTASSIUM 50 MG/1
TABLET ORAL
Qty: 90 TABLET | Refills: 3 | Status: SHIPPED | OUTPATIENT
Start: 2021-12-01 | End: 2024-04-12

## 2021-12-22 DIAGNOSIS — I10 ESSENTIAL HYPERTENSION: ICD-10-CM

## 2021-12-24 RX ORDER — CHLORTHALIDONE 25 MG/1
TABLET ORAL
Qty: 90 TABLET | Refills: 3 | Status: SHIPPED | OUTPATIENT
Start: 2021-12-24 | End: 2024-04-12

## 2021-12-24 NOTE — TELEPHONE ENCOUNTER
"Routing refill request to provider for review/approval because:  B/P not in range  Break in medication    Last Written Prescription Date:   chlorthalidone (HYGROTEN) 25 MG tablet 90 tablet 3 9/15/2020  No   Sig - Route: Take 1 tablet (25 mg total) by mouth daily. - Oral   Sent to pharmacy as: chlorthalidone 25 mg tablet (HYGROTEN)   E-Prescribing Status: Receipt confirmed by pharmacy (9/15/2020  3:47 PM CDT)       Last office visit provider:  9/13/21     Requested Prescriptions   Pending Prescriptions Disp Refills     chlorthalidone (HYGROTON) 25 MG tablet [Pharmacy Med Name: CHLORTHALIDONE 25 MG TABLET] 90 tablet 3     Sig: TAKE 1 TABLET BY MOUTH EVERY DAY       Diuretics (Including Combos) Protocol Failed - 12/22/2021  7:18 AM        Failed - Blood pressure under 140/90 in past 12 months     BP Readings from Last 3 Encounters:   09/13/21 (!) 142/88   08/27/20 132/89   07/18/20 138/77                 Passed - Recent (12 mo) or future (30 days) visit within the authorizing provider's specialty     Patient has had an office visit with the authorizing provider or a provider within the authorizing providers department within the previous 12 mos or has a future within next 30 days. See \"Patient Info\" tab in inbasket, or \"Choose Columns\" in Meds & Orders section of the refill encounter.              Passed - Medication is active on med list        Passed - Patient is age 18 or older        Passed - Normal serum creatinine on file in past 12 months     Recent Labs   Lab Test 09/13/21  1440   CR 0.75              Passed - Normal serum potassium on file in past 12 months     Recent Labs   Lab Test 09/13/21  1440   POTASSIUM 4.1                    Passed - Normal serum sodium on file in past 12 months     Recent Labs   Lab Test 09/13/21  1440                      Susana Erickson RN 12/24/21 11:11 AM  "

## 2022-06-15 ENCOUNTER — TRANSFERRED RECORDS (OUTPATIENT)
Dept: HEALTH INFORMATION MANAGEMENT | Facility: CLINIC | Age: 62
End: 2022-06-15
Payer: COMMERCIAL

## 2022-09-13 ENCOUNTER — TRANSFERRED RECORDS (OUTPATIENT)
Dept: HEALTH INFORMATION MANAGEMENT | Facility: CLINIC | Age: 62
End: 2022-09-13

## 2022-10-01 ENCOUNTER — HEALTH MAINTENANCE LETTER (OUTPATIENT)
Age: 62
End: 2022-10-01

## 2023-01-23 ASSESSMENT — ENCOUNTER SYMPTOMS
HEMATURIA: 0
HEARTBURN: 0
CHILLS: 0
EYE PAIN: 0
COUGH: 0
NERVOUS/ANXIOUS: 0
WEAKNESS: 0
FEVER: 0
HEMATOCHEZIA: 1
SORE THROAT: 0
JOINT SWELLING: 0
FREQUENCY: 0
DYSURIA: 0
DIARRHEA: 0
DIZZINESS: 0
MYALGIAS: 0
ARTHRALGIAS: 0
SHORTNESS OF BREATH: 0
PALPITATIONS: 0
PARESTHESIAS: 0
ABDOMINAL PAIN: 0
HEADACHES: 0
CONSTIPATION: 0
NAUSEA: 0

## 2023-01-30 ENCOUNTER — OFFICE VISIT (OUTPATIENT)
Dept: INTERNAL MEDICINE | Facility: CLINIC | Age: 63
End: 2023-01-30
Payer: COMMERCIAL

## 2023-01-30 VITALS
SYSTOLIC BLOOD PRESSURE: 132 MMHG | WEIGHT: 227 LBS | HEIGHT: 75 IN | BODY MASS INDEX: 28.23 KG/M2 | DIASTOLIC BLOOD PRESSURE: 80 MMHG | TEMPERATURE: 98.4 F | RESPIRATION RATE: 16 BRPM | OXYGEN SATURATION: 98 % | HEART RATE: 64 BPM

## 2023-01-30 DIAGNOSIS — I10 ESSENTIAL HYPERTENSION: ICD-10-CM

## 2023-01-30 DIAGNOSIS — Z00.00 ENCOUNTER FOR ROUTINE ADULT MEDICAL EXAMINATION: Primary | ICD-10-CM

## 2023-01-30 DIAGNOSIS — E78.00 HYPERCHOLESTEREMIA: ICD-10-CM

## 2023-01-30 DIAGNOSIS — I48.0 PAROXYSMAL ATRIAL FIBRILLATION (H): ICD-10-CM

## 2023-01-30 LAB
ALBUMIN SERPL BCG-MCNC: 4.6 G/DL (ref 3.5–5.2)
ALP SERPL-CCNC: 77 U/L (ref 40–129)
ALT SERPL W P-5'-P-CCNC: 58 U/L (ref 10–50)
ANION GAP SERPL CALCULATED.3IONS-SCNC: 15 MMOL/L (ref 7–15)
AST SERPL W P-5'-P-CCNC: 65 U/L (ref 10–50)
BILIRUB SERPL-MCNC: 0.5 MG/DL
BUN SERPL-MCNC: 13 MG/DL (ref 8–23)
CALCIUM SERPL-MCNC: 9.5 MG/DL (ref 8.8–10.2)
CHLORIDE SERPL-SCNC: 96 MMOL/L (ref 98–107)
CHOLEST SERPL-MCNC: 205 MG/DL
CREAT SERPL-MCNC: 0.65 MG/DL (ref 0.67–1.17)
DEPRECATED HCO3 PLAS-SCNC: 26 MMOL/L (ref 22–29)
GFR SERPL CREATININE-BSD FRML MDRD: >90 ML/MIN/1.73M2
GLUCOSE SERPL-MCNC: 90 MG/DL (ref 70–99)
HDLC SERPL-MCNC: 82 MG/DL
LDLC SERPL CALC-MCNC: 112 MG/DL
NONHDLC SERPL-MCNC: 123 MG/DL
POTASSIUM SERPL-SCNC: 4.2 MMOL/L (ref 3.4–5.3)
PROT SERPL-MCNC: 7.7 G/DL (ref 6.4–8.3)
PSA SERPL-MCNC: 0.95 NG/ML (ref 0–4.5)
SODIUM SERPL-SCNC: 137 MMOL/L (ref 136–145)
TRIGL SERPL-MCNC: 53 MG/DL

## 2023-01-30 PROCEDURE — 99396 PREV VISIT EST AGE 40-64: CPT | Performed by: INTERNAL MEDICINE

## 2023-01-30 PROCEDURE — 36415 COLL VENOUS BLD VENIPUNCTURE: CPT | Performed by: INTERNAL MEDICINE

## 2023-01-30 PROCEDURE — 80053 COMPREHEN METABOLIC PANEL: CPT | Performed by: INTERNAL MEDICINE

## 2023-01-30 PROCEDURE — G0103 PSA SCREENING: HCPCS | Performed by: INTERNAL MEDICINE

## 2023-01-30 PROCEDURE — 80061 LIPID PANEL: CPT | Performed by: INTERNAL MEDICINE

## 2023-01-30 PROCEDURE — 99214 OFFICE O/P EST MOD 30 MIN: CPT | Mod: 25 | Performed by: INTERNAL MEDICINE

## 2023-01-30 RX ORDER — RIVAROXABAN 20 MG/1
1 TABLET, FILM COATED ORAL DAILY
COMMUNITY
Start: 2022-12-29

## 2023-01-30 ASSESSMENT — ENCOUNTER SYMPTOMS
SHORTNESS OF BREATH: 0
MYALGIAS: 0
CONSTIPATION: 0
HEMATOCHEZIA: 1
DYSURIA: 0
ABDOMINAL PAIN: 0
DIARRHEA: 0
HEADACHES: 0
ARTHRALGIAS: 0
PARESTHESIAS: 0
EYE PAIN: 0
JOINT SWELLING: 0
PALPITATIONS: 0
NAUSEA: 0
FEVER: 0
DIZZINESS: 0
FREQUENCY: 0
HEMATURIA: 0
CHILLS: 0
WEAKNESS: 0
COUGH: 0
HEARTBURN: 0
SORE THROAT: 0
NERVOUS/ANXIOUS: 0

## 2023-01-30 NOTE — PROGRESS NOTES
SUBJECTIVE:   CC: Phillip is an 62 year old who presents for preventative health visit.     Alan comes in today for his yearly physical.  He is feeling well today and has no acute concerns.  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  Excited about this.  Otherwise past medical history above and beyond his physical exam is reviewed and is outlined as below:    History of hypertension, on losartan, chlorthalidone, and metoprolol.  Blood pressure is well controlled today.     Hyperlipidemia, currently on simvastatin.       Atrial fibrillation, well rate controlled.  He is on Xarelto for anticoagulation.      Patient has been advised of split billing requirements and indicates understanding: Yes  Healthy Habits:     Getting at least 3 servings of Calcium per day:  Yes    Bi-annual eye exam:  Yes    Dental care twice a year:  Yes    Sleep apnea or symptoms of sleep apnea:  None    Diet:  Regular (no restrictions)    Frequency of exercise:  2-3 days/week    Duration of exercise:  15-30 minutes    Taking medications regularly:  Yes    Medication side effects:  None    PHQ-2 Total Score: 0    Additional concerns today:  No        Today's PHQ-2 Score:   PHQ-2 ( 1999 Pfizer) 1/23/2023   Q1: Little interest or pleasure in doing things 0   Q2: Feeling down, depressed or hopeless 0   PHQ-2 Score 0   PHQ-2 Total Score (12-17 Years)- Positive if 3 or more points; Administer PHQ-A if positive -   Q1: Little interest or pleasure in doing things Not at all   Q2: Feeling down, depressed or hopeless Not at all   PHQ-2 Score 0       Have you ever done Advance Care Planning? (For example, a Health Directive, POLST, or a discussion with a medical provider or your loved ones about your wishes): Yes, advance care planning is on file.    Social History     Tobacco Use     Smoking status: Never     Smokeless tobacco: Never   Substance Use Topics     Alcohol use: Yes     Alcohol/week: 6.0 - 8.0 standard  drinks     If you drink alcohol do you typically have >3 drinks per day or >7 drinks per week? No    Alcohol Use 1/23/2023   Prescreen: >3 drinks/day or >7 drinks/week? No   Prescreen: >3 drinks/day or >7 drinks/week? -   AUDIT SCORE  -     Last PSA:   Prostate Specific Antigen Screen   Date Value Ref Range Status   09/13/2021 1.20 0.00 - 4.50 ug/L Final         Review of Systems   Constitutional: Negative for chills and fever.   HENT: Negative for congestion, ear pain, hearing loss and sore throat.    Eyes: Negative for pain and visual disturbance.   Respiratory: Negative for cough and shortness of breath.    Cardiovascular: Negative for chest pain, palpitations and peripheral edema.   Gastrointestinal: Positive for hematochezia. Negative for abdominal pain, constipation, diarrhea, heartburn and nausea.   Genitourinary: Negative for dysuria, frequency, genital sores, hematuria, impotence, penile discharge and urgency.   Musculoskeletal: Negative for arthralgias, joint swelling and myalgias.   Skin: Negative for rash.   Neurological: Negative for dizziness, weakness, headaches and paresthesias.   Psychiatric/Behavioral: Negative for mood changes. The patient is not nervous/anxious.        OBJECTIVE:   There were no vitals taken for this visit.    Physical Exam  GENERAL: healthy, alert and no distress  EYES: Eyes grossly normal to inspection, PERRL and conjunctivae and sclerae normal  HENT: ear canals and TM's normal, nose and mouth without ulcers or lesions  NECK: no adenopathy, no asymmetry, masses, or scars and thyroid normal to palpation  RESP: lungs clear to auscultation - no rales, rhonchi or wheezes  CV: regular rate and rhythm, normal S1 S2, no S3 or S4, no murmur, click or rub, no peripheral edema and peripheral pulses strong  ABDOMEN: soft, nontender, no hepatosplenomegaly, no masses and bowel sounds normal  MS: no gross musculoskeletal defects noted, no edema  SKIN: no suspicious lesions or rashes  NEURO:  Normal strength and tone, mentation intact and speech normal  PSYCH: mentation appears normal, affect normal/bright    Diagnostic Test Results:  Labs reviewed in Epic    ASSESSMENT/PLAN:   (Z00.00) Encounter for routine adult medical examination  (primary encounter diagnosis)  Comment: Vaccinations are up-to-date.  Colon cancer screening will be due in March and he is going to make an appointment to get a colonoscopy at that time.  Check labs as below.  Follow-up 1 year, earlier if needed.  Plan: PSA, screen            (I48.0) Paroxysmal atrial fibrillation (H)  Comment: Stable, well rate controlled.  Continue follow-up with cardiology as scheduled.  Continue Xarelto.  Plan:     (I10) Hypertension  Comment: Stable.  Check labs as below.  Plan: Comprehensive metabolic panel (BMP + Alb, Alk         Phos, ALT, AST, Total. Bili, TP)            (E78.00) Hypercholesteremia- CAC 26- 04/11/2018  Comment:   Plan: Lipid Profile (Chol, Trig, HDL, LDL calc)        Stable.  Check lipids.          He reports that he has never smoked. He has never used smokeless tobacco.            Maurizio Ellison MD  [facility]

## 2023-04-19 ENCOUNTER — TRANSFERRED RECORDS (OUTPATIENT)
Dept: HEALTH INFORMATION MANAGEMENT | Facility: CLINIC | Age: 63
End: 2023-04-19
Payer: COMMERCIAL

## 2023-12-13 ENCOUNTER — E-VISIT (OUTPATIENT)
Dept: URGENT CARE | Facility: CLINIC | Age: 63
End: 2023-12-13
Payer: COMMERCIAL

## 2023-12-13 DIAGNOSIS — U07.1 INFECTION DUE TO 2019 NOVEL CORONAVIRUS: Primary | ICD-10-CM

## 2023-12-13 PROCEDURE — 99207 PR NON-BILLABLE SERV PER CHARTING: CPT | Performed by: NURSE PRACTITIONER

## 2023-12-13 NOTE — PATIENT INSTRUCTIONS
Dear Alan Frausto,    We are sorry you are not feeling well. Based on the responses you provided, it is recommended that you be seen in-person in urgent care so we can better evaluate your symptoms. Please click here to find the nearest urgent care location to you.   You will not be charged for this Visit. Thank you for trusting us with your care.    Korey Julien NP

## 2024-02-15 ENCOUNTER — PATIENT OUTREACH (OUTPATIENT)
Dept: GASTROENTEROLOGY | Facility: CLINIC | Age: 64
End: 2024-02-15
Payer: COMMERCIAL

## 2024-03-03 ENCOUNTER — HEALTH MAINTENANCE LETTER (OUTPATIENT)
Age: 64
End: 2024-03-03

## 2024-04-05 SDOH — HEALTH STABILITY: PHYSICAL HEALTH: ON AVERAGE, HOW MANY DAYS PER WEEK DO YOU ENGAGE IN MODERATE TO STRENUOUS EXERCISE (LIKE A BRISK WALK)?: 4 DAYS

## 2024-04-05 SDOH — HEALTH STABILITY: PHYSICAL HEALTH: ON AVERAGE, HOW MANY MINUTES DO YOU ENGAGE IN EXERCISE AT THIS LEVEL?: 20 MIN

## 2024-04-05 ASSESSMENT — SOCIAL DETERMINANTS OF HEALTH (SDOH): HOW OFTEN DO YOU GET TOGETHER WITH FRIENDS OR RELATIVES?: ONCE A WEEK

## 2024-04-12 ENCOUNTER — OFFICE VISIT (OUTPATIENT)
Dept: FAMILY MEDICINE | Facility: CLINIC | Age: 64
End: 2024-04-12
Payer: COMMERCIAL

## 2024-04-12 VITALS
BODY MASS INDEX: 28.54 KG/M2 | RESPIRATION RATE: 16 BRPM | HEIGHT: 74 IN | HEART RATE: 55 BPM | TEMPERATURE: 97.7 F | SYSTOLIC BLOOD PRESSURE: 136 MMHG | WEIGHT: 222.4 LBS | DIASTOLIC BLOOD PRESSURE: 84 MMHG | OXYGEN SATURATION: 99 %

## 2024-04-12 DIAGNOSIS — E78.00 HYPERCHOLESTEREMIA: ICD-10-CM

## 2024-04-12 DIAGNOSIS — Z00.00 ENCOUNTER FOR MEDICARE ANNUAL WELLNESS EXAM: Primary | ICD-10-CM

## 2024-04-12 DIAGNOSIS — M54.50 CHRONIC BILATERAL LOW BACK PAIN WITHOUT SCIATICA: ICD-10-CM

## 2024-04-12 DIAGNOSIS — G89.29 CHRONIC BILATERAL LOW BACK PAIN WITHOUT SCIATICA: ICD-10-CM

## 2024-04-12 DIAGNOSIS — E78.2 MIXED HYPERLIPIDEMIA: ICD-10-CM

## 2024-04-12 DIAGNOSIS — H11.002 PTERYGIUM EYE, LEFT: ICD-10-CM

## 2024-04-12 DIAGNOSIS — Z83.2 FAMILY HISTORY OF PERNICIOUS ANEMIA: ICD-10-CM

## 2024-04-12 DIAGNOSIS — I48.0 PAROXYSMAL ATRIAL FIBRILLATION (H): ICD-10-CM

## 2024-04-12 DIAGNOSIS — Z12.5 SCREENING FOR PROSTATE CANCER: ICD-10-CM

## 2024-04-12 DIAGNOSIS — I10 ESSENTIAL HYPERTENSION: ICD-10-CM

## 2024-04-12 DIAGNOSIS — R74.01 ELEVATED ASPARTATE AMINOTRANSFERASE LEVEL: ICD-10-CM

## 2024-04-12 LAB
ALBUMIN SERPL BCG-MCNC: 4.5 G/DL (ref 3.5–5.2)
ALP SERPL-CCNC: 67 U/L (ref 40–150)
ALT SERPL W P-5'-P-CCNC: 47 U/L (ref 0–70)
ANION GAP SERPL CALCULATED.3IONS-SCNC: 10 MMOL/L (ref 7–15)
AST SERPL W P-5'-P-CCNC: 44 U/L (ref 0–45)
BASOPHILS # BLD AUTO: 0.1 10E3/UL (ref 0–0.2)
BASOPHILS NFR BLD AUTO: 1 %
BILIRUB SERPL-MCNC: 0.6 MG/DL
BUN SERPL-MCNC: 13.2 MG/DL (ref 8–23)
CALCIUM SERPL-MCNC: 9.7 MG/DL (ref 8.8–10.2)
CHLORIDE SERPL-SCNC: 100 MMOL/L (ref 98–107)
CHOLEST SERPL-MCNC: 177 MG/DL
CREAT SERPL-MCNC: 0.72 MG/DL (ref 0.67–1.17)
DEPRECATED HCO3 PLAS-SCNC: 28 MMOL/L (ref 22–29)
EGFRCR SERPLBLD CKD-EPI 2021: >90 ML/MIN/1.73M2
EOSINOPHIL # BLD AUTO: 0.1 10E3/UL (ref 0–0.7)
EOSINOPHIL NFR BLD AUTO: 1 %
ERYTHROCYTE [DISTWIDTH] IN BLOOD BY AUTOMATED COUNT: 11 % (ref 10–15)
FASTING STATUS PATIENT QL REPORTED: YES
GLUCOSE SERPL-MCNC: 110 MG/DL (ref 70–99)
HCT VFR BLD AUTO: 42.1 % (ref 40–53)
HDLC SERPL-MCNC: 56 MG/DL
HGB BLD-MCNC: 14.5 G/DL (ref 13.3–17.7)
IMM GRANULOCYTES # BLD: 0 10E3/UL
IMM GRANULOCYTES NFR BLD: 0 %
LDLC SERPL CALC-MCNC: 101 MG/DL
LYMPHOCYTES # BLD AUTO: 1.6 10E3/UL (ref 0.8–5.3)
LYMPHOCYTES NFR BLD AUTO: 25 %
MCH RBC QN AUTO: 31.5 PG (ref 26.5–33)
MCHC RBC AUTO-ENTMCNC: 34.4 G/DL (ref 31.5–36.5)
MCV RBC AUTO: 91 FL (ref 78–100)
MONOCYTES # BLD AUTO: 0.6 10E3/UL (ref 0–1.3)
MONOCYTES NFR BLD AUTO: 10 %
NEUTROPHILS # BLD AUTO: 3.9 10E3/UL (ref 1.6–8.3)
NEUTROPHILS NFR BLD AUTO: 63 %
NONHDLC SERPL-MCNC: 121 MG/DL
PLATELET # BLD AUTO: 234 10E3/UL (ref 150–450)
POTASSIUM SERPL-SCNC: 3.9 MMOL/L (ref 3.4–5.3)
PROT SERPL-MCNC: 7.5 G/DL (ref 6.4–8.3)
PSA SERPL DL<=0.01 NG/ML-MCNC: 0.96 NG/ML (ref 0–4.5)
RBC # BLD AUTO: 4.61 10E6/UL (ref 4.4–5.9)
SODIUM SERPL-SCNC: 138 MMOL/L (ref 135–145)
TRIGL SERPL-MCNC: 98 MG/DL
WBC # BLD AUTO: 6.2 10E3/UL (ref 4–11)

## 2024-04-12 PROCEDURE — 80061 LIPID PANEL: CPT | Performed by: STUDENT IN AN ORGANIZED HEALTH CARE EDUCATION/TRAINING PROGRAM

## 2024-04-12 PROCEDURE — 80053 COMPREHEN METABOLIC PANEL: CPT | Performed by: STUDENT IN AN ORGANIZED HEALTH CARE EDUCATION/TRAINING PROGRAM

## 2024-04-12 PROCEDURE — 85025 COMPLETE CBC W/AUTO DIFF WBC: CPT | Performed by: STUDENT IN AN ORGANIZED HEALTH CARE EDUCATION/TRAINING PROGRAM

## 2024-04-12 PROCEDURE — G0009 ADMIN PNEUMOCOCCAL VACCINE: HCPCS | Performed by: STUDENT IN AN ORGANIZED HEALTH CARE EDUCATION/TRAINING PROGRAM

## 2024-04-12 PROCEDURE — G0402 INITIAL PREVENTIVE EXAM: HCPCS | Mod: 25 | Performed by: STUDENT IN AN ORGANIZED HEALTH CARE EDUCATION/TRAINING PROGRAM

## 2024-04-12 PROCEDURE — 91320 SARSCV2 VAC 30MCG TRS-SUC IM: CPT | Performed by: STUDENT IN AN ORGANIZED HEALTH CARE EDUCATION/TRAINING PROGRAM

## 2024-04-12 PROCEDURE — 36415 COLL VENOUS BLD VENIPUNCTURE: CPT | Performed by: STUDENT IN AN ORGANIZED HEALTH CARE EDUCATION/TRAINING PROGRAM

## 2024-04-12 PROCEDURE — 90677 PCV20 VACCINE IM: CPT | Performed by: STUDENT IN AN ORGANIZED HEALTH CARE EDUCATION/TRAINING PROGRAM

## 2024-04-12 PROCEDURE — G0103 PSA SCREENING: HCPCS | Performed by: STUDENT IN AN ORGANIZED HEALTH CARE EDUCATION/TRAINING PROGRAM

## 2024-04-12 PROCEDURE — 90480 ADMN SARSCOV2 VAC 1/ONLY CMP: CPT | Performed by: STUDENT IN AN ORGANIZED HEALTH CARE EDUCATION/TRAINING PROGRAM

## 2024-04-12 PROCEDURE — 99214 OFFICE O/P EST MOD 30 MIN: CPT | Mod: 25 | Performed by: STUDENT IN AN ORGANIZED HEALTH CARE EDUCATION/TRAINING PROGRAM

## 2024-04-12 RX ORDER — CHLORTHALIDONE 25 MG/1
25 TABLET ORAL DAILY
Qty: 90 TABLET | Refills: 3 | Status: SHIPPED | OUTPATIENT
Start: 2024-04-12

## 2024-04-12 RX ORDER — LOSARTAN POTASSIUM 50 MG/1
50 TABLET ORAL DAILY
Qty: 90 TABLET | Refills: 3 | Status: SHIPPED | OUTPATIENT
Start: 2024-04-12

## 2024-04-12 RX ORDER — SIMVASTATIN 40 MG
40 TABLET ORAL DAILY
Qty: 90 TABLET | Refills: 3 | Status: SHIPPED | OUTPATIENT
Start: 2024-04-12

## 2024-04-12 RX ORDER — METOPROLOL SUCCINATE 25 MG/1
25 TABLET, EXTENDED RELEASE ORAL DAILY
Qty: 90 TABLET | Refills: 3 | Status: SHIPPED | OUTPATIENT
Start: 2024-04-12

## 2024-04-12 NOTE — PROGRESS NOTES
Preventive Care Visit  [facility]  Chung Vega MD, Family Medicine  Apr 12, 2024      Assessment & Plan     Encounter for Medicare annual wellness exam  Alan is a 63-year-old male with history of paroxysmal atrial fibrillation, hypertension and hyperlipidemia who presents today for Medicare annual wellness exam.  He has no acute concerns at this time, chronic issues are discussed further below.    Up-to-date on colon cancer screening next colonoscopy due in 2030    Discussed prostate cancer screening, he was agreeable and PSA testing was ordered.    Hypercholesteremia  Has history of hyper cholesterolemia, has been treated with simvastatin 40 mg daily.  Will repeat lipid panel, may need to switch statin medication, does not look like his cholesterol has been well-controlled previously however will wait new results prior to making any recommendations    Paroxysmal atrial fibrillation (H)  Has been followed by cardiology, is on Toprol-XL 25 mg daily and Xarelto.  Continue follow-up with cardiology    Essential hypertension  Well-controlled, continue losartan 50 mg daily and Toprol-XL 25 mg daily, will obtain renal function panel    Elevated aspartate aminotransferase level  Had elevated transaminases 1 year ago, in the setting of prior imaging showing liver with fatty infiltration.  Concern for NAFLD.  Recommend for further testing, with repeat CMP, and also obtain CBC.  If liver enzymes still elevated, would consider FibroScan, iron studies, hepatitis B studies.  Had hepatitis C with negative result previously.    Possibly iatrogenic from statin use, however given the imaging findings to lean more towards NAFLD.    Mixed hyperlipidemia  As above, not well-controlled on simvastatin previously, we will repeat lipid panel, make further recommendations thereafter    Chronic bilateral low back pain without sciatica  Has chronic low back pain bilaterally, managing with exercise only.  No  "significant pain at this time.  Do recommend yoga, or and/or filippo chi to help prevent episodes of back pain.  Return to care if there is worsening back pain, if it is limiting function    Pterygium eye, left  Has a pterygium of left eye, medial.  It is asymmetric, so I do recommend he see an ophthalmologist, he reports he sees his ophthalmologist annually, encouraged him to make an appointment for next available for this to be looked at by the eye doctor.  He was agreeable    Family history of pernicious anemia  He reports that family history of pernicious anemia, father needed B12 injections towards the end of his life.  Will obtain a CBC today.      BMI  Estimated body mass index is 28.3 kg/m  as calculated from the following:    Height as of this encounter: 1.888 m (6' 2.33\").    Weight as of this encounter: 100.9 kg (222 lb 6.4 oz).   The 10-year ASCVD risk score (Karen ULLOA, et al., 2019) is: 12.6%    Values used to calculate the score:      Age: 63 years      Sex: Male      Is Non- : No      Diabetic: No      Tobacco smoker: No      Systolic Blood Pressure: 136 mmHg      Is BP treated: Yes      HDL Cholesterol: 82 mg/dL      Total Cholesterol: 205 mg/dL      Counseling  Appropriate preventive services were discussed with this patient, including applicable screening as appropriate for fall prevention, nutrition, physical activity, Tobacco-use cessation, weight loss and cognition.  Checklist reviewing preventive services available has been given to the patient.  Reviewed patient's diet, addressing concerns and/or questions.   The patient reports drinking more than one alcoholic drink per day and sometimes engages in binge or excessive drinking. The patient was counseled and given information about possible harmful effects of excessive alcohol intake as well as where to get help for alcohol problems.     Follow up with cardiology     Subjective   Phillip is a 63 year old, presenting for the " following:  Medicare Visit and Establish Care        4/12/2024     9:10 AM   Additional Questions   Roomed by Kandace SANTAMARIA        Health Care Directive  Patient does not have a Health Care Directive or Living Will: Discussed advance care planning with patient; information given to patient to review.    HPI          4/5/2024   General Health   How would you rate your overall physical health? Good   Feel stress (tense, anxious, or unable to sleep) Not at all         4/5/2024   Nutrition   Diet: Regular (no restrictions)         4/5/2024   Exercise   Days per week of moderate/strenous exercise 4 days   Average minutes spent exercising at this level 20 min         4/5/2024   Social Factors   Frequency of gathering with friends or relatives Once a week   Worry food won't last until get money to buy more No   Food not last or not have enough money for food? No   Do you have housing?  Yes   Are you worried about losing your housing? No   Lack of transportation? No   Unable to get utilities (heat,electricity)? No         4/11/2024   Fall Risk   Fallen 2 or more times in the past year? No   Trouble with walking or balance? No          4/5/2024   Activities of Daily Living- Home Safety   Needs help with the following daily activites None of the above   Safety concerns in the home None of the above         4/5/2024   Dental   Dentist two times every year? Yes         4/5/2024   Hearing Screening   Hearing concerns? None of the above         4/5/2024   Driving Risk Screening   Patient/family members have concerns about driving No         4/5/2024   General Alertness/Fatigue Screening   Have you been more tired than usual lately? No         4/5/2024   Urinary Incontinence Screening   Bothered by leaking urine in past 6 months No         4/5/2024   TB Screening   Were you born outside of the [place]? No         Today's PHQ-2 Score:       4/11/2024    11:12 AM   PHQ-2 ( 1999 Pfizer)   Q1: Little interest or pleasure in doing things 0    Q2: Feeling down, depressed or hopeless 0   PHQ-2 Score 0   Q1: Little interest or pleasure in doing things Not at all   Q2: Feeling down, depressed or hopeless Not at all   PHQ-2 Score 0           4/5/2024   Substance Use   Alcohol more than 3/day or more than 7/wk Yes   How often do you have a drink containing alcohol 2 to 3 times a week   How many alcohol drinks on typical day 3 or 4   How often do you have 5+ drinks at one occasion Monthly   Audit 2/3 Score 3   How often not able to stop drinking once started Never   How often failed to do what normally expected Never   How often needed first drink in am after a heavy drinking session Never   How often feeling of guilt or remorse after drinking Never   How often unable to remember what happened the night before Never   Have you or someone else been injured because of your drinking No   Has anyone been concerned or suggested you cut down on drinking No   TOTAL SCORE - AUDIT 6   Do you have a current opioid prescription? No   How severe/bad is pain from 1 to 10? 1/10   Do you use any other substances recreationally? No     Social History     Tobacco Use    Smoking status: Never     Passive exposure: Never    Smokeless tobacco: Never   Vaping Use    Vaping status: Never Used   Substance Use Topics    Alcohol use: Yes     Alcohol/week: 6.0 - 8.0 standard drinks of alcohol    Drug use: No           4/5/2024   AAA Screening   Family history of Abdominal Aortic Aneurysm (AAA)? (!) YES Father was a smoker.    Last PSA:   Prostate Specific Antigen Screen   Date Value Ref Range Status   01/30/2023 0.95 0.00 - 4.50 ng/mL Final   09/13/2021 1.20 0.00 - 4.50 ug/L Final     ASCVD Risk   The 10-year ASCVD risk score (Karen ULLOA, et al., 2019) is: 12.6%    Values used to calculate the score:      Age: 63 years      Sex: Male      Is Non- : No      Diabetic: No      Tobacco smoker: No      Systolic Blood Pressure: 136 mmHg      Is BP treated:  Yes      HDL Cholesterol: 82 mg/dL      Total Cholesterol: 205 mg/dL            Reviewed and updated as needed this visit by Provider                    No past medical history on file.  Past Surgical History:   Procedure Laterality Date    CATARACT EXTRACTION Right 1998    INGUINAL HERNIA REPAIR Left 2007     BP Readings from Last 3 Encounters:   04/12/24 136/84   01/30/23 132/80   09/13/21 (!) 142/88    Wt Readings from Last 3 Encounters:   04/12/24 100.9 kg (222 lb 6.4 oz)   01/30/23 103 kg (227 lb)   09/13/21 102.3 kg (225 lb 8 oz)                  Patient Active Problem List   Diagnosis    Dupuytren's Contracture    Hypercholesteremia- CAC 26- 04/11/2018    Hypertension    Adenomatous polyp of colon    Paroxysmal atrial fibrillation (H)     Past Surgical History:   Procedure Laterality Date    CATARACT EXTRACTION Right 1998    INGUINAL HERNIA REPAIR Left 2007       Social History     Tobacco Use    Smoking status: Never     Passive exposure: Never    Smokeless tobacco: Never   Substance Use Topics    Alcohol use: Yes     Alcohol/week: 6.0 - 8.0 standard drinks of alcohol     Family History   Problem Relation Age of Onset    Macular Degeneration Father     Cancer Mother         bladder - smoker         Current Outpatient Medications   Medication Sig Dispense Refill    chlorthalidone (HYGROTON) 25 MG tablet TAKE 1 TABLET BY MOUTH EVERY DAY 90 tablet 3    cholecalciferol, vitamin D3, (VITAMIN D3) 2,000 unit capsule [CHOLECALCIFEROL, VITAMIN D3, (VITAMIN D3) 2,000 UNIT CAPSULE] Take 1,000 Units by mouth daily.      losartan (COZAAR) 50 MG tablet TAKE 1 TABLET BY MOUTH EVERY DAY 90 tablet 3    LUTEIN PO Take by mouth daily      metoprolol succinate (TOPROL-XL) 25 MG [METOPROLOL SUCCINATE (TOPROL-XL) 25 MG] Take 25 mg by mouth daily.      multivitamin therapeutic tablet [MULTIVITAMIN THERAPEUTIC TABLET] Take 1 tablet by mouth daily.      simvastatin (ZOCOR) 40 MG tablet [SIMVASTATIN (ZOCOR) 40 MG TABLET] Take 40 mg  "by mouth daily.      XARELTO ANTICOAGULANT 20 MG TABS tablet Take 1 tablet by mouth daily       No Known Allergies  Recent Labs   Lab Test 01/30/23  1359 09/13/21  1440 08/27/20  0852 08/08/19  0840   * 102 117 148*   HDL 82 70 63 75   TRIG 53 141 170* 69   ALT 58* 33 36 29   CR 0.65* 0.75 0.75 0.69*   GFRESTIMATED >90 >90 >60 >60   GFRESTBLACK  --   --  >60 >60   POTASSIUM 4.2 4.1 4.3 3.8      Current providers sharing in care for this patient include:  Patient Care Team:  Chung Vega MD as PCP - General (Family Medicine)  Clinic - [facility] [place] as Assigned PCP    The following health maintenance items are reviewed in Epic and correct as of today:  Health Maintenance   Topic Date Due    ANNUAL REVIEW OF  ORDERS  Never done    RSV VACCINE (Pregnancy & 60+) (1 - 1-dose 60+ series) Never done    INFLUENZA VACCINE (1) 09/01/2023    COVID-19 Vaccine (6 - 2023-24 season) 09/01/2023    MEDICARE ANNUAL WELLNESS VISIT  01/30/2024    LIPID  01/30/2024    Pneumococcal Vaccine: 65+ Years (1 of 1 - PCV) 10/28/2023    FALL RISK ASSESSMENT  04/12/2025    GLUCOSE  01/30/2026    ADVANCE CARE PLANNING  01/30/2028    COLORECTAL CANCER SCREENING  04/19/2030    DTAP/TDAP/TD IMMUNIZATION (3 - Td or Tdap) 07/04/2030    HEPATITIS C SCREENING  Completed    PHQ-2 (once per calendar year)  Completed    ZOSTER IMMUNIZATION  Completed    IPV IMMUNIZATION  Aged Out    HPV IMMUNIZATION  Aged Out    MENINGITIS IMMUNIZATION  Aged Out    RSV MONOCLONAL ANTIBODY  Aged Out    HIV SCREENING  Discontinued         Review of Systems  Constitutional, neuro, ENT, endocrine, pulmonary, cardiac, gastrointestinal, genitourinary, musculoskeletal, integument and psychiatric systems are negative, except as otherwise noted.     Objective    Exam  /84 (BP Location: Right arm, Patient Position: Sitting, Cuff Size: Adult Regular)   Pulse 55   Temp 97.7  F (36.5  C) (Oral)   Resp 16   Ht 1.888 m (6' 2.33\")   Wt 100.9 kg (222 lb " "6.4 oz)   SpO2 99%   BMI 28.30 kg/m     Estimated body mass index is 28.3 kg/m  as calculated from the following:    Height as of this encounter: 1.888 m (6' 2.33\").    Weight as of this encounter: 100.9 kg (222 lb 6.4 oz).    Physical Exam  GENERAL: alert and no distress  EYES: Eyes grossly normal to inspection, PERRL.  Presence of pterygium on left medial eye, not overly vascularized.  HENT: ear canals and TM's normal, nose and mouth without ulcers or lesions  NECK: no adenopathy, no asymmetry, masses, or scars  RESP: lungs clear to auscultation - no rales, rhonchi or wheezes  CV: regular rate and rhythm, normal S1 S2, no S3 or S4, no murmur, click or rub, no peripheral edema  ABDOMEN: soft, nontender, no hepatosplenomegaly, no masses and bowel sounds normal  MS: no gross musculoskeletal defects noted, no edema  SKIN: no suspicious lesions or rashes  NEURO: Normal strength and tone, mentation intact and speech normal  PSYCH: mentation appears normal, affect normal/bright         4/12/2024   Mini Cog   Clock Draw Score 2 Normal   3 Item Recall 2 objects recalled   Mini Cog Total Score 4         Vision Screen  Vision Screen Results: Pass  No Corrective Lenses, PLUS LENS REQUIRED: Pass      Signed Electronically by: Chung Vega MD    "

## 2024-04-12 NOTE — PATIENT INSTRUCTIONS
Preventive Care Advice   This is general advice given by our system to help you stay healthy. However, your care team may have specific advice just for you. Please talk to your care team about your preventive care needs.  Nutrition  Eat 5 or more servings of fruits and vegetables each day.  Try wheat bread, brown rice and whole grain pasta (instead of white bread, rice, and pasta).  Get enough calcium and vitamin D. Check the label on foods and aim for 100% of the RDA (recommended daily allowance).  Lifestyle  Exercise at least 150 minutes each week   (30 minutes a day, 5 days a week).  Do muscle strengthening activities 2 days a week. These help control your weight and prevent disease.  No smoking.  Wear sunscreen to prevent skin cancer.  Have a dental exam and cleaning every 6 months.  Yearly exams  See your health care team every year to talk about:  Any changes in your health.  Any medicines your care team has prescribed.  Preventive care, family planning, and ways to prevent chronic diseases.  Shots (vaccines)   HPV shots (up to age 26), if you've never had them before.  Hepatitis B shots (up to age 59), if you've never had them before.  COVID-19 shot: Get this shot when it's due.  Flu shot: Get a flu shot every year.  Tetanus shot: Get a tetanus shot every 10 years.  Pneumococcal, hepatitis A, and RSV shots: Ask your care team if you need these based on your risk.  Shingles shot (for age 50 and up).  General health tests  Diabetes screening:  Starting at age 35, Get screened for diabetes at least every 3 years.  If you are younger than age 35, ask your care team if you should be screened for diabetes.  Cholesterol test: At age 39, start having a cholesterol test every 5 years, or more often if advised.  Bone density scan (DEXA): At age 50, ask your care team if you should have this scan for osteoporosis (brittle bones).  Hepatitis C: Get tested at least once in your life.  STIs (sexually transmitted  infections)  Before age 24: Ask your care team if you should be screened for STIs.  After age 24: Get screened for STIs if you're at risk. You are at risk for STIs (including HIV) if:  You are sexually active with more than one person.  You don't use condoms every time.  You or a partner was diagnosed with a sexually transmitted infection.  If you are at risk for HIV, ask about PrEP medicine to prevent HIV.  Get tested for HIV at least once in your life, whether you are at risk for HIV or not.  Cancer screening tests  Cervical cancer screening: If you have a cervix, begin getting regular cervical cancer screening tests at age 21. Most people who have regular screenings with normal results can stop after age 65. Talk about this with your provider.  Breast cancer scan (mammogram): If you've ever had breasts, begin having regular mammograms starting at age 40. This is a scan to check for breast cancer.  Colon cancer screening: It is important to start screening for colon cancer at age 45.  Have a colonoscopy test every 10 years (or more often if you're at risk) Or, ask your provider about stool tests like a FIT test every year or Cologuard test every 3 years.  To learn more about your testing options, visit: [email or web address]/[email or web address].  For help making a decision, visit: https://bit.ly/lh78254.  Prostate cancer screening test: If you have a prostate and are age 55 to 69, ask your provider if you would benefit from a yearly prostate cancer screening test.  Lung cancer screening: If you are a current or former smoker age 50 to 80, ask your care team if ongoing lung cancer screenings are right for you.  For informational purposes only. Not to replace the advice of your health care provider. Copyright   2023 [employer]. All rights reserved. Clinically reviewed by the [facility] Transitions Program. [employer] [id number] - REV 01/24.    Learning About Alcohol Use Disorder  What is alcohol use  disorder?  Alcohol use disorder means that a person drinks alcohol even though it causes harm to themselves or others. It can range from mild to severe. The more symptoms of this disorder you have, the more severe it may be. People who have it may find it hard to control their use of alcohol.  People who have this disorder may argue with others about how much they're drinking. Their job may be affected because of drinking. They may drink when it's dangerous or illegal, such as when they drive. They also may have a strong need, or craving, to drink. They may feel like they must drink just to get by. Their drinking may increase their risk of getting hurt or being in a car crash.  Over time, drinking too much alcohol may cause health problems. These may include high blood pressure, liver problems, or problems with digestion.  What are the symptoms?  Maybe you've wondered about your alcohol habits or how to tell if your drinking is becoming a problem.  Here are some of the symptoms of alcohol use disorder. You may have it if you have two or more of the following symptoms:  You drink larger amounts of alcohol than you ever meant to. Or you've been drinking for a longer time than you ever meant to.  You can't cut down or control your use. Or you constantly wish you could cut down.  You spend a lot of time getting or drinking alcohol or recovering from its effects.  You have strong cravings for alcohol.  You can no longer do your main jobs at work, at school, or at home.  You keep drinking alcohol, even though your use hurts your relationships.  You have stopped doing important activities because of your alcohol use.  You drink alcohol in situations where doing so is dangerous.  You keep drinking alcohol even though you know it's causing health problems.  You need more and more alcohol to get the same effect, or you get less effect from the same amount over time. This is called tolerance.  You have uncomfortable symptoms  "when you stop drinking alcohol or use less. This is called withdrawal.  Alcohol use disorder can range from mild to severe. The more symptoms you have, the more severe the disorder may be.  You might not realize that your drinking is a problem. You might not drink large amounts when you drink. Or you might go for days or weeks between drinking episodes. But even if you don't drink very often, your drinking could still be harmful and put you at risk.  How is alcohol use disorder treated?  Getting help is up to you. But you don't have to do it alone. There are many people and kinds of treatments that can help.  Treatment for alcohol use disorder can include:  Group therapy, one or more types of counseling, and alcohol education.  Medicines that help to:  Reduce withdrawal symptoms and help you safely stop drinking.  Reduce cravings for alcohol.  Support groups. These groups include Alcoholics Anonymous and [facility] [facility] (Self-Management and Recovery Training).  Some people are able to stop or cut back on drinking with help from a counselor. People who have moderate to severe alcohol use disorder may need medical treatment. They may need to stay in a hospital or treatment center.  You may have a treatment team to help you. This team may include a psychologist or psychiatrist, counselors, doctors, social workers, nurses, and a . A  helps plan and manage your treatment.  Follow-up care is a key part of your treatment and safety. Be sure to make and go to all appointments, and call your doctor if you are having problems. It's also a good idea to know your test results and keep a list of the medicines you take.  Where can you learn more?  Go to https://www.healthEuroffice.net/patiented  Enter H758 in the search box to learn more about \"Learning About Alcohol Use Disorder.\"  Current as of: November 15, 2023               Content Version: 14.0    2763-8386 [employer].   Care instructions " adapted under license by your healthcare professional. If you have questions about a medical condition or this instruction, always ask your healthcare professional. [employer] disclaims any warranty or liability for your use of this information.

## 2025-03-13 ENCOUNTER — PATIENT OUTREACH (OUTPATIENT)
Dept: CARE COORDINATION | Facility: CLINIC | Age: 65
End: 2025-03-13
Payer: COMMERCIAL

## 2025-03-27 ENCOUNTER — PATIENT OUTREACH (OUTPATIENT)
Dept: CARE COORDINATION | Facility: CLINIC | Age: 65
End: 2025-03-27
Payer: COMMERCIAL

## 2025-05-18 ENCOUNTER — HEALTH MAINTENANCE LETTER (OUTPATIENT)
Age: 65
End: 2025-05-18

## 2025-06-01 DIAGNOSIS — E78.2 MIXED HYPERLIPIDEMIA: ICD-10-CM

## 2025-06-02 RX ORDER — SIMVASTATIN 40 MG
40 TABLET ORAL DAILY
Qty: 90 TABLET | Refills: 3 | Status: SHIPPED | OUTPATIENT
Start: 2025-06-02

## 2025-06-05 SDOH — HEALTH STABILITY: PHYSICAL HEALTH: ON AVERAGE, HOW MANY DAYS PER WEEK DO YOU ENGAGE IN MODERATE TO STRENUOUS EXERCISE (LIKE A BRISK WALK)?: 2 DAYS

## 2025-06-05 SDOH — HEALTH STABILITY: PHYSICAL HEALTH: ON AVERAGE, HOW MANY MINUTES DO YOU ENGAGE IN EXERCISE AT THIS LEVEL?: 40 MIN

## 2025-06-05 ASSESSMENT — SOCIAL DETERMINANTS OF HEALTH (SDOH): HOW OFTEN DO YOU GET TOGETHER WITH FRIENDS OR RELATIVES?: TWICE A WEEK

## 2025-06-10 ENCOUNTER — OFFICE VISIT (OUTPATIENT)
Dept: FAMILY MEDICINE | Facility: CLINIC | Age: 65
End: 2025-06-10
Attending: STUDENT IN AN ORGANIZED HEALTH CARE EDUCATION/TRAINING PROGRAM
Payer: COMMERCIAL

## 2025-06-10 VITALS
BODY MASS INDEX: 28.76 KG/M2 | WEIGHT: 224.1 LBS | HEART RATE: 60 BPM | RESPIRATION RATE: 16 BRPM | DIASTOLIC BLOOD PRESSURE: 86 MMHG | OXYGEN SATURATION: 98 % | SYSTOLIC BLOOD PRESSURE: 138 MMHG | HEIGHT: 74 IN

## 2025-06-10 DIAGNOSIS — G89.29 CHRONIC BILATERAL LOW BACK PAIN WITHOUT SCIATICA: ICD-10-CM

## 2025-06-10 DIAGNOSIS — R73.09 ELEVATED GLUCOSE: ICD-10-CM

## 2025-06-10 DIAGNOSIS — Z23 ENCOUNTER FOR IMMUNIZATION: ICD-10-CM

## 2025-06-10 DIAGNOSIS — Z23 NEED FOR VACCINATION: ICD-10-CM

## 2025-06-10 DIAGNOSIS — I48.0 PAROXYSMAL ATRIAL FIBRILLATION (H): ICD-10-CM

## 2025-06-10 DIAGNOSIS — M54.50 CHRONIC BILATERAL LOW BACK PAIN WITHOUT SCIATICA: ICD-10-CM

## 2025-06-10 DIAGNOSIS — E78.00 HYPERCHOLESTEREMIA: ICD-10-CM

## 2025-06-10 DIAGNOSIS — I10 ESSENTIAL HYPERTENSION: ICD-10-CM

## 2025-06-10 DIAGNOSIS — Z00.00 ENCOUNTER FOR MEDICARE ANNUAL WELLNESS EXAM: Primary | ICD-10-CM

## 2025-06-10 DIAGNOSIS — Z82.49 FAMILY HISTORY OF AORTIC ANEURYSM: ICD-10-CM

## 2025-06-10 DIAGNOSIS — Z12.5 SCREENING FOR PROSTATE CANCER: ICD-10-CM

## 2025-06-10 DIAGNOSIS — H11.002 PTERYGIUM EYE, LEFT: ICD-10-CM

## 2025-06-10 PROCEDURE — 36415 COLL VENOUS BLD VENIPUNCTURE: CPT | Performed by: STUDENT IN AN ORGANIZED HEALTH CARE EDUCATION/TRAINING PROGRAM

## 2025-06-10 RX ORDER — SENNOSIDES 8.6 MG
325 CAPSULE ORAL DAILY
COMMUNITY
End: 2025-06-10

## 2025-06-10 NOTE — PATIENT INSTRUCTIONS
Patient Education   Preventive Care Advice   This is general advice given by our system to help you stay healthy. However, your care team may have specific advice just for you. Please talk to your care team about your preventive care needs.  Nutrition  Eat 5 or more servings of fruits and vegetables each day.  Try wheat bread, brown rice and whole grain pasta (instead of white bread, rice, and pasta).  Get enough calcium and vitamin D. Check the label on foods and aim for 100% of the RDA (recommended daily allowance).  Lifestyle  Exercise at least 150 minutes each week  (30 minutes a day, 5 days a week).  Do muscle strengthening activities 2 days a week. These help control your weight and prevent disease.  No smoking.  Wear sunscreen to prevent skin cancer.  Have a dental exam and cleaning every 6 months.  Yearly exams  See your health care team every year to talk about:  Any changes in your health.  Any medicines your care team has prescribed.  Preventive care, family planning, and ways to prevent chronic diseases.  Shots (vaccines)   HPV shots (up to age 26), if you've never had them before.  Hepatitis B shots (up to age 59), if you've never had them before.  COVID-19 shot: Get this shot when it's due.  Flu shot: Get a flu shot every year.  Tetanus shot: Get a tetanus shot every 10 years.  Pneumococcal, hepatitis A, and RSV shots: Ask your care team if you need these based on your risk.  Shingles shot (for age 50 and up)  General health tests  Diabetes screening:  Starting at age 35, Get screened for diabetes at least every 3 years.  If you are younger than age 35, ask your care team if you should be screened for diabetes.  Cholesterol test: At age 39, start having a cholesterol test every 5 years, or more often if advised.  Bone density scan (DEXA): At age 50, ask your care team if you should have this scan for osteoporosis (brittle bones).  Hepatitis C: Get tested at least once in your life.  STIs (sexually  transmitted infections)  Before age 24: Ask your care team if you should be screened for STIs.  After age 24: Get screened for STIs if you're at risk. You are at risk for STIs (including HIV) if:  You are sexually active with more than one person.  You don't use condoms every time.  You or a partner was diagnosed with a sexually transmitted infection.  If you are at risk for HIV, ask about PrEP medicine to prevent HIV.  Get tested for HIV at least once in your life, whether you are at risk for HIV or not.  Cancer screening tests  Cervical cancer screening: If you have a cervix, begin getting regular cervical cancer screening tests starting at age 21.  Breast cancer scan (mammogram): If you've ever had breasts, begin having regular mammograms starting at age 40. This is a scan to check for breast cancer.  Colon cancer screening: It is important to start screening for colon cancer at age 45.  Have a colonoscopy test every 10 years (or more often if you're at risk) Or, ask your provider about stool tests like a FIT test every year or Cologuard test every 3 years.  To learn more about your testing options, visit:   .  For help making a decision, visit:   https://bit.ly/jq54902.  Prostate cancer screening test: If you have a prostate, ask your care team if a prostate cancer screening test (PSA) at age 55 is right for you.  Lung cancer screening: If you are a current or former smoker ages 50 to 80, ask your care team if ongoing lung cancer screenings are right for you.  For informational purposes only. Not to replace the advice of your health care provider. Copyright   2023 [facility]. All rights reserved. Clinically reviewed by the [facility] Transitions Program. [employer] [id number] - REV 01/24.  9 Ways to Cut Back on Drinking  Maybe you've found yourself drinking more alcohol than you'd prefer. If you want to cut back, here are some ideas to try.    Think before you drink.  Do you really want a drink,  "or is it just a habit? If you're used to having a drink at a certain time, try doing something else then.     Look for substitutes.  Find some no-alcohol drinks that you enjoy, like flavored seltzer water, tea with honey, or tonic with a slice of lime. Or try alcohol-free beer or \"virgin\" cocktails (without the alcohol).     Drink more water.  Use water to quench your thirst. Drink a glass of water before you have any alcohol. Have another glass along with every drink or between drinks.     Shrink your drink.  For example, have a bottle of beer instead of a pint. Use a smaller glass for wine. Choose drinks with lower alcohol content (ABV%). Or use less liquor and more mixer in cocktails.     Slow down.  It's easy to drink quickly and without thinking about it. Pay attention, and make each drink last longer.     Do the math.  Total up how much you spend on alcohol each month. How much is that a year? If you cut back, what could you do with the money you save?     Take a break.  Choose a day or two each week when you won't drink at all. Notice how you feel on those days, physically and emotionally. How did you sleep? Do you feel better? Over time, add more break days.     Count calories.  Would you like to lose some weight? For some people that's a good motivator for cutting back. Figure out how many calories are in each drink. How many does that add up to in a day? In a week? In a month?     Practice saying no.  Be ready when someone offers you a drink. Try: \"Thanks, I've had enough.\" Or \"Thanks, but I'm cutting back.\" Or \"No, thanks. I feel better when I drink less.\"   Current as of: August 20, 2024  Content Version: 14.5 2024-2025 [employer].   Care instructions adapted under license by your healthcare professional. If you have questions about a medical condition or this instruction, always ask your healthcare professional. [employer] disclaims any warranty or liability for your use of " this information.

## 2025-06-10 NOTE — PROGRESS NOTES
Preventive Care Visit  [facility]  Chung Vega MD, Family Medicine  Thompson 10, 2025      Assessment & Plan     Encounter for Medicare annual wellness exam  Alan is a 64-year-old male with history of paroxysmal atrial fibrillation, hypertension and hyperlipidemia who presents today for Medicare annual wellness exam.  He has no acute concerns at this time, chronic issues are discussed further below.     Up-to-date on colon cancer screening next colonoscopy due in 2030     Discussed prostate cancer screening, he was agreeable and PSA testing was ordered.    Recommended for RSV vaccination given cardiac history, he was agreeable, this was ordered to his pharmacy.    Paroxysmal atrial fibrillation (H)  Had a rate controlled on Toprol-XL 25 mg which he continues to take however he stopped taking Xarelto due to the cost.  Reports that he was charged $1500 last year for the medication and so he stopped it.  We discussed the risks for stroke, he has a [id number] score of 2, it would be recommended that he restart his anticoagulation.  He should stop aspirin as this is not likely to assist him in preventing stroke and will increase bleeding risk.    He reports understanding we will go back on rivaroxaban starting at 20 mg daily.  Will continue following with cardiology.    - rivaroxaban ANTICOAGULANT (XARELTO ANTICOAGULANT) 20 MG TABS tablet  Dispense: 90 tablet; Refill: 3    Essential hypertension  Well-controlled on Toprol-XL 25 and Cozaar 50, chlorthalidone 25 mg daily.  Will continue current medication, repeat metabolic panel.  - BASIC METABOLIC PANEL  - BASIC METABOLIC PANEL    Elevated glucose  Elevated glucose levels last year, he reports he is fasting today, we will repeat glucose level that was in the prediabetic range last year    Hypercholesteremia  Has a 10-year ASCVD risk of 15%, currently on simvastatin 40 mg.  Previously had a CAC score of 26 with CT imaging last done 2018 for this  "purpose.    Recommend that we try to get an LDL goal below 100, ideally below 70.  Will repeat lipid panel today, depending on LDL level may recommend for switching to rosuvastatin.    - Lipid panel reflex to direct LDL Non-fasting  - Lipid panel reflex to direct LDL Non-fasting    Chronic bilateral low back pain without sciatica  Has chronic low back pain bilaterally, managing with exercise only.  No significant pain at this time.  Do recommend yoga, or and/or filippo chi to help prevent episodes of back pain.  Return to care if there is worsening back pain, if it is limiting function     Pterygium eye, left  Has a pterygium of left eye, medial.  It is asymmetric, saw his ophthalmologist for this, they report that it is not cancerous, will continue to follow-up with his regular eye doctor, if there is any change in size will need to have this reevaluated.      Family history of aortic aneurysm  Father had history of aortic aneurysm which required repair/stenting.  Reports that his father was a heavy smoker.  However given his family history would recommend for AAA screening although he does not have any tobacco use history.  Ultrasound ordered, will follow-up after results.  - [place] Aorta Medicare AAA Screening    Screening for prostate cancer    - PSA, screen  - PSA, screen    Need for vaccination      Encounter for immunization    - COVID-19 12+ (PFIZER)  - RSV vaccine, bivalent, ABRYSVO, injection  Dispense: 0.5 mL; Refill: 0              BMI  Estimated body mass index is 28.52 kg/m  as calculated from the following:    Height as of this encounter: 1.888 m (6' 2.33\").    Weight as of this encounter: 101.7 kg (224 lb 1.6 oz).   Weight management plan: Discussed healthy diet and exercise guidelines    Counseling  Appropriate preventive services were addressed with this patient via screening, questionnaire, or discussion as appropriate for fall prevention, nutrition, physical activity, Tobacco-use cessation, social " engagement, weight loss and cognition.  Checklist reviewing preventive services available has been given to the patient.  Reviewed patient's diet, addressing concerns and/or questions.   He is at risk for lack of exercise and has been provided with information to increase physical activity for the benefit of his well-being.   The patient reports drinking more than 3 alcoholic drinks per day and/or more than 7 drhnks per week. The patient was counseled and given information about possible harmful effects of excessive alcohol intake.    Follow-up    Follow-up Visit   Expected date:  Jun 17, 2026 (Approximate)      Follow Up Appointment Details:     Follow-up with whom?: PCP    Follow-Up for what?: Medicare Wellness    Welcome or Annual?: Annual Wellness    How?: In Person                 Annalee Fisher is a 64 year old, presenting for the following:  Wellness Visit (Fasting. )        6/10/2025     1:49 PM   Additional Questions   Roomed by Marlys ALICEA RN          HPI  He does not have any acute concerns.  Reports that he does have some back pain but he is able to manage it and can still function like he wants to.  He did see his ophthalmologist to look at the pterygium on his eye and said it was not cancerous and we just need to watch it for changes in size.       Advance Care Planning    Patient states has Health Care Directive and will send to [employer].        6/5/2025   General Health   How would you rate your overall physical health? Good   Feel stress (tense, anxious, or unable to sleep) Not at all         6/5/2025   Nutrition   Diet: Regular (no restrictions)         6/5/2025   Exercise   Days per week of moderate/strenous exercise 2 days   Average minutes spent exercising at this level 40 min   (!) EXERCISE CONCERN      6/5/2025   Social Factors   Frequency of gathering with friends or relatives Twice a week   Worry food won't last until get money to buy more No   Food not last or not have enough money  for food? No   Do you have housing? (Housing is defined as stable permanent housing and does not include staying outside in a car, in a tent, in an abandoned building, in an overnight shelter, or couch-surfing.) Yes   Are you worried about losing your housing? No   Lack of transportation? No   Unable to get utilities (heat,electricity)? No         6/5/2025   Fall Risk   Fallen 2 or more times in the past year? No   Trouble with walking or balance? No          6/5/2025   Activities of Daily Living- Home Safety   Needs help with the following daily activites None of the above   Safety concerns in the home None of the above         6/5/2025   Dental   Dentist two times every year? Yes         6/5/2025   Hearing Screening   Hearing concerns? None of the above         6/5/2025   Driving Risk Screening   Patient/family members have concerns about driving No         6/5/2025   General Alertness/Fatigue Screening   Have you been more tired than usual lately? No         6/5/2025   Urinary Incontinence Screening   Bothered by leaking urine in past 6 months No         Today's PHQ-2 Score:       6/9/2025     4:19 PM   PHQ-2 ( 1999 Pf[employer])   Q1: Little interest or pleasure in doing things 0   Q2: Feeling down, depressed or hopeless 0   PHQ-2 Score 0    Q1: Little interest or pleasure in doing things Not at all   Q2: Feeling down, depressed or hopeless Not at all   PHQ-2 Score 0       Patient-reported           6/5/2025   Substance Use   Alcohol more than 3/day or more than 7/wk Yes   How often do you have a drink containing alcohol 4 or more times a week   How many alcohol drinks on typical day 3 or 4   How often do you have 5+ drinks at one occasion Less than monthly   Audit 2/3 Score 2   How often not able to stop drinking once started Never   How often failed to do what normally expected Never   How often needed first drink in am after a heavy drinking session Never   How often feeling of guilt or remorse after drinking Never    How often unable to remember what happened the night before Never   Have you or someone else been injured because of your drinking No   Has anyone been concerned or suggested you cut down on drinking No   TOTAL SCORE - AUDIT 6   Do you have a current opioid prescription? No   How severe/bad is pain from 1 to 10? 0/10 (No Pain)   Do you use any other substances recreationally? No     Social History     Tobacco Use    Smoking status: Never     Passive exposure: Never    Smokeless tobacco: Never   Vaping Use    Vaping status: Never Used   Substance Use Topics    Alcohol use: Yes     Alcohol/week: 6.0 - 8.0 standard drinks of alcohol    Drug use: No           6/5/2025   AAA Screening   Family history of Abdominal Aortic Aneurysm (AAA)? (!) YES father   Last PSA:   Prostate Specific Antigen Screen   Date Value Ref Range Status   04/12/2024 0.96 0.00 - 4.50 ng/mL Final   09/13/2021 1.20 0.00 - 4.50 ug/L Final     ASCVD Risk   The 10-year ASCVD risk score (Karen ULLOA, et al., 2019) is: 15.7%    Values used to calculate the score:      Age: 64 years      Sex: Male      Is Non- : No      Diabetic: No      Tobacco smoker: No      Systolic Blood Pressure: 138 mmHg      Is BP treated: Yes      HDL Cholesterol: 56 mg/dL      Total Cholesterol: 177 mg/dL            Reviewed and updated as needed this visit by Provider   Tobacco  Allergies  Meds  Problems  Med Hx  Surg Hx  Fam Hx     Sexual Activity          Past Medical History:   Diagnosis Date    Hypertension 1990     Past Surgical History:   Procedure Laterality Date    CATARACT EXTRACTION Right 01/01/1998    COLONOSCOPY  2023    EYE SURGERY  1993    Cataract    HERNIA REPAIR  2005    INGUINAL HERNIA REPAIR Left 01/01/2007     Labs reviewed in EPIC  BP Readings from Last 3 Encounters:   06/10/25 138/86   04/12/24 136/84   01/30/23 132/80    Wt Readings from Last 3 Encounters:   06/10/25 101.7 kg (224 lb 1.6 oz)   04/12/24 100.9 kg (222  lb 6.4 oz)   01/30/23 103 kg (227 lb)                  Patient Active Problem List   Diagnosis    Dupuytren's Contracture    Hypercholesteremia- CAC 26- 04/11/2018    Hypertension    Adenomatous polyp of colon    Paroxysmal atrial fibrillation (H)     Past Surgical History:   Procedure Laterality Date    CATARACT EXTRACTION Right 01/01/1998    COLONOSCOPY  2023    EYE SURGERY  1993    Cataract    HERNIA REPAIR  2005    INGUINAL HERNIA REPAIR Left 01/01/2007       Social History     Tobacco Use    Smoking status: Never     Passive exposure: Never    Smokeless tobacco: Never   Substance Use Topics    Alcohol use: Yes     Alcohol/week: 6.0 - 8.0 standard drinks of alcohol     Family History   Problem Relation Age of Onset    Cancer Mother         bladder - smoker    Colon Cancer Mother     Macular Degeneration Father     Abdominal Aortic Aneurysm Father          Current Outpatient Medications   Medication Sig Dispense Refill    chlorthalidone (HYGROTON) 25 MG tablet Take 1 tablet (25 mg) by mouth daily 90 tablet 3    cholecalciferol, vitamin D3, (VITAMIN D3) 2,000 unit capsule [CHOLECALCIFEROL, VITAMIN D3, (VITAMIN D3) 2,000 UNIT CAPSULE] Take 1,000 Units by mouth daily.      losartan (COZAAR) 50 MG tablet Take 1 tablet (50 mg) by mouth daily 90 tablet 3    LUTEIN PO Take by mouth daily      metoprolol succinate ER (TOPROL XL) 25 MG 24 hr tablet Take 1 tablet (25 mg) by mouth daily 90 tablet 3    multivitamin therapeutic tablet [MULTIVITAMIN THERAPEUTIC TABLET] Take 1 tablet by mouth daily.      rivaroxaban ANTICOAGULANT (XARELTO ANTICOAGULANT) 20 MG TABS tablet Take 1 tablet (20 mg) by mouth daily. 90 tablet 3    RSV vaccine, bivalent, ABRYSVO, injection Inject 0.5 mLs into the muscle once for 1 dose. Pharmacist administered 0.5 mL 0    simvastatin (ZOCOR) 40 MG tablet TAKE 1 TABLET BY MOUTH EVERY DAY 90 tablet 3     No Known Allergies  Recent Labs   Lab Test 04/12/24  1050 01/30/23  1359 09/13/21  1440 08/27/20  0874  "08/27/20  0852 08/08/19  0840   * 112* 102  --  117 148*   HDL 56 82 70  --  63 75   TRIG 98 53 141  --  170* 69   ALT 47 58* 33  --  36 29   CR 0.72 0.65* 0.75  --  0.75 0.69*   GFRESTIMATED >90 >90 >90   < > >60 >60   GFRESTBLACK  --   --   --   --  >60 >60   POTASSIUM 3.9 4.2 4.1  --  4.3 3.8    < > = values in this interval not displayed.      Current providers sharing in care for this patient include:  Patient Care Team:  Chung Vega MD as PCP - General (Family Medicine)  Chung Vega MD as Assigned PCP    The following health maintenance items are reviewed in Epic and correct as of today:  Health Maintenance   Topic Date Due    RSV VACCINE (1 - Risk 60-74 years 1-dose series) Never done    COVID-19 VACCINE (7 - 2024-25 season) 09/01/2024    MEDICARE ANNUAL WELLNESS VISIT  04/12/2025    BMP  04/12/2025    LIPID  04/12/2025    ANNUAL REVIEW OF HM ORDERS  04/12/2025    INFLUENZA VACCINE (Season Ended) 09/01/2025    FALL RISK ASSESSMENT  06/10/2026    DIABETES SCREENING  04/12/2027    ADVANCE CARE PLANNING  04/12/2029    COLORECTAL CANCER SCREENING  04/19/2030    DTAP/TDAP/TD VACCINE (3 - Td or Tdap) 07/04/2030    HEPATITIS C SCREENING  Completed    PHQ-2 (once per calendar year)  Completed    PNEUMOCOCCAL VACCINE 50+ YEARS  Completed    ZOSTER VACCINE  Completed    HPV VACCINE  Aged Out    MENINGITIS VACCINE  Aged Out         Review of Systems  Constitutional, neuro, ENT, endocrine, pulmonary, cardiac, gastrointestinal, genitourinary, musculoskeletal, integument and psychiatric systems are negative, except as otherwise noted.     Objective    Exam  /86 (BP Location: Right arm, Patient Position: Sitting, Cuff Size: Adult Regular)   Pulse 60   Resp 16   Ht 1.888 m (6' 2.33\")   Wt 101.7 kg (224 lb 1.6 oz)   SpO2 98%   BMI 28.52 kg/m     Estimated body mass index is 28.52 kg/m  as calculated from the following:    Height as of this encounter: 1.888 m (6' 2.33\").    Weight as of this " encounter: 101.7 kg (224 lb 1.6 oz).    Physical Exam  GENERAL: alert and no distress  EYES: Eyes grossly normal to inspection, pterygium of left eye medial, appears similar to last year, PERRL and conjunctivae and sclerae normal  HENT: ear canals and TM's normal, nose and mouth without ulcers or lesions  NECK: no adenopathy, no asymmetry, masses, or scars  RESP: lungs clear to auscultation - no rales, rhonchi or wheezes  CV: regular rate and rhythm, normal S1 S2, no S3 or S4, no murmur, click or rub, no peripheral edema  ABDOMEN: soft, nontender, no hepatosplenomegaly, no masses and bowel sounds normal  MS: no gross musculoskeletal defects noted, no edema  SKIN: no suspicious lesions or rashes  NEURO: Normal strength and tone, mentation intact and speech normal  PSYCH: mentation appears normal, affect normal/bright        6/10/2025   Mini Cog   Clock Draw Score 2 Normal   3 Item Recall 3 objects recalled   Mini Cog Total Score 5             4/12/2024   Vision Screen   Vision Screen Results Pass   No Corrective Lenses, PLUS LENS REQUIRED Pass       Signed Electronically by: Chung Vega MD      Wellness Visit Notes:     -Colon Cancer Screening: Last done via colonoscopy on 4/19/2023. (Impression: Colorectal polyps). Due 4/19/2030. Plan: Repeat in 2030.    -PSA: Last done 4/12/2024 (Result: 0.96). Advised to recheck in 1 year. Patient requesting PSA lab work.   Discussed risks/benefits of PSA testing today in detail, including possibility of false positive results and/or possibility of biopsy recommended as next step.     -Dermatology: Pt verbalized they do not meet with dermatology regularly.  Has been several years. Patient plans to make appointment soon.     -Immunizations:   Patient is due for the following vaccines: RSV and Zoster. Advised patient to receive at a pharmacy due to Medicare insurance coverage.   Patient is due/able to receive at clinic today: COVID-19

## 2025-06-11 LAB
ANION GAP SERPL CALCULATED.3IONS-SCNC: 11 MMOL/L (ref 7–15)
BUN SERPL-MCNC: 10.6 MG/DL (ref 8–23)
CALCIUM SERPL-MCNC: 9.9 MG/DL (ref 8.8–10.4)
CHLORIDE SERPL-SCNC: 95 MMOL/L (ref 98–107)
CHOLEST SERPL-MCNC: 220 MG/DL
CREAT SERPL-MCNC: 0.67 MG/DL (ref 0.67–1.17)
EGFRCR SERPLBLD CKD-EPI 2021: >90 ML/MIN/1.73M2
FASTING STATUS PATIENT QL REPORTED: ABNORMAL
FASTING STATUS PATIENT QL REPORTED: ABNORMAL
GLUCOSE SERPL-MCNC: 94 MG/DL (ref 70–99)
HCO3 SERPL-SCNC: 30 MMOL/L (ref 22–29)
HDLC SERPL-MCNC: 84 MG/DL
LDLC SERPL CALC-MCNC: 123 MG/DL
NONHDLC SERPL-MCNC: 136 MG/DL
POTASSIUM SERPL-SCNC: 3.9 MMOL/L (ref 3.4–5.3)
PSA SERPL DL<=0.01 NG/ML-MCNC: 1.15 NG/ML (ref 0–4.5)
SODIUM SERPL-SCNC: 136 MMOL/L (ref 135–145)
TRIGL SERPL-MCNC: 67 MG/DL

## 2025-06-12 ENCOUNTER — RESULTS FOLLOW-UP (OUTPATIENT)
Dept: FAMILY MEDICINE | Facility: CLINIC | Age: 65
End: 2025-06-12
Payer: COMMERCIAL

## 2025-06-12 DIAGNOSIS — E78.2 MIXED HYPERLIPIDEMIA: Primary | ICD-10-CM

## 2025-06-12 DIAGNOSIS — R93.1 AGATSTON CAC SCORE, <100: ICD-10-CM

## 2025-06-12 RX ORDER — ROSUVASTATIN CALCIUM 5 MG/1
5 TABLET, COATED ORAL DAILY
Qty: 90 TABLET | Refills: 3 | Status: SHIPPED | OUTPATIENT
Start: 2025-06-12